# Patient Record
Sex: FEMALE | Race: WHITE | NOT HISPANIC OR LATINO
[De-identification: names, ages, dates, MRNs, and addresses within clinical notes are randomized per-mention and may not be internally consistent; named-entity substitution may affect disease eponyms.]

---

## 2021-06-08 ENCOUNTER — TRANSCRIPTION ENCOUNTER (OUTPATIENT)
Age: 66
End: 2021-06-08

## 2021-06-21 ENCOUNTER — TRANSCRIPTION ENCOUNTER (OUTPATIENT)
Age: 66
End: 2021-06-21

## 2021-12-21 ENCOUNTER — TRANSCRIPTION ENCOUNTER (OUTPATIENT)
Age: 66
End: 2021-12-21

## 2022-01-12 ENCOUNTER — TRANSCRIPTION ENCOUNTER (OUTPATIENT)
Age: 67
End: 2022-01-12

## 2022-06-18 ENCOUNTER — EMERGENCY (EMERGENCY)
Facility: HOSPITAL | Age: 67
LOS: 1 days | Discharge: HOME | End: 2022-06-18
Attending: EMERGENCY MEDICINE
Payer: MEDICARE

## 2022-06-18 VITALS
SYSTOLIC BLOOD PRESSURE: 138 MMHG | DIASTOLIC BLOOD PRESSURE: 79 MMHG | HEIGHT: 61 IN | WEIGHT: 169.09 LBS | RESPIRATION RATE: 18 BRPM | OXYGEN SATURATION: 99 % | HEART RATE: 72 BPM | TEMPERATURE: 99 F

## 2022-06-18 PROCEDURE — 72100 X-RAY EXAM L-S SPINE 2/3 VWS: CPT

## 2022-06-18 PROCEDURE — 99284 EMERGENCY DEPT VISIT MOD MDM: CPT

## 2022-06-18 PROCEDURE — 99283 EMERGENCY DEPT VISIT LOW MDM: CPT | Mod: 25

## 2022-06-18 PROCEDURE — 96372 THER/PROPH/DIAG INJ SC/IM: CPT

## 2022-06-18 PROCEDURE — 72100 X-RAY EXAM L-S SPINE 2/3 VWS: CPT | Mod: 26

## 2022-06-18 RX ORDER — METHOCARBAMOL 500 MG/1
2 TABLET, FILM COATED ORAL
Qty: 30 | Refills: 0
Start: 2022-06-18 | End: 2022-06-22

## 2022-06-18 RX ORDER — KETOROLAC TROMETHAMINE 30 MG/ML
15 SYRINGE (ML) INJECTION ONCE
Refills: 0 | Status: DISCONTINUED | OUTPATIENT
Start: 2022-06-18 | End: 2022-06-18

## 2022-06-18 RX ORDER — METHOCARBAMOL 500 MG/1
1500 TABLET, FILM COATED ORAL ONCE
Refills: 0 | Status: COMPLETED | OUTPATIENT
Start: 2022-06-18 | End: 2022-06-18

## 2022-06-18 RX ORDER — ACETAMINOPHEN 500 MG
2 TABLET ORAL
Qty: 168 | Refills: 0
Start: 2022-06-18 | End: 2022-07-01

## 2022-06-18 RX ORDER — ACETAMINOPHEN 500 MG
650 TABLET ORAL ONCE
Refills: 0 | Status: COMPLETED | OUTPATIENT
Start: 2022-06-18 | End: 2022-06-18

## 2022-06-18 RX ADMIN — Medication 650 MILLIGRAM(S): at 13:23

## 2022-06-18 RX ADMIN — Medication 15 MILLIGRAM(S): at 13:24

## 2022-06-18 RX ADMIN — METHOCARBAMOL 1500 MILLIGRAM(S): 500 TABLET, FILM COATED ORAL at 13:24

## 2022-06-18 NOTE — ED PROVIDER NOTE - OBJECTIVE STATEMENT
67 yr old female with hx of spinal stenosis and lumbar disc herniation since 2019 seeing pain management s/p steroid injection 2 month ago (not first time) presents to ed c/o left lumbar pain radiating to lateral thigh down to knee like a burning throbbing pain since 1 day. + chronic constipation, no incontinence, no fever, no abd pain, no dysuria, no syncope, no trauma, no weigh loss. tried meloxicam and nsaid oitment without relieve. states usually after steroid injection pt is without pain.

## 2022-06-18 NOTE — ED PROVIDER NOTE - PROGRESS NOTE DETAILS
Vora: improve. pt ambulating. xr reviewed. no acute injury on my read.  dx lumbar strain likely worse due to known spinal stenosis and sciatica. rx robaxin  and tylenol. please use heat packs to area 3x/day 20 mins each session, take motrin 600mg every 6 hrs as needed, tylenol 650mg every 4 hrs as needed, stay active, no heavy lifting and return if symptoms  worsens.

## 2022-06-18 NOTE — ED PROVIDER NOTE - CONSTITUTIONAL, MLM
normal... Well appearing, awake, alert, oriented to person, place, time/situation and in no apparent distress. obese, lying on her right side

## 2022-06-18 NOTE — ED PROVIDER NOTE - NEUROLOGICAL, MLM
Alert and oriented, no focal deficits, no motor or sensory deficits. ambulating with limp on left side

## 2022-06-18 NOTE — ED PROVIDER NOTE - CLINICAL SUMMARY MEDICAL DECISION MAKING FREE TEXT BOX
67 yr old female with hx of spinal stenosis and lumbar disc herniation since 2019 seeing pain management s/p steroid injection 2 month ago (not first time) presents to ed c/o left lumbar pain radiating to lateral thigh down to knee like a burning throbbing pain since 1 day. + chronic constipation, no incontinence, no fever, no abd pain, no dysuria, no syncope, no trauma, no weigh loss. tried meloxicam and nsaid oitment without relieve. states usually after steroid injection pt is without pain.    left known sciatica and spinal stenosis likely flare no red flags. pt ambulatory no incontinence. - xr lumbar to r/o occult compression fx. robaxin, toradol, tylenol, likely dc.

## 2022-06-18 NOTE — ED PROVIDER NOTE - PATIENT PORTAL LINK FT
You can access the FollowMyHealth Patient Portal offered by [facility] by registering at the following website: [email or web address]. By joining [employer]’s FollowMyHealth portal, you will also be able to view your health information using other applications (apps) compatible with our system.

## 2022-09-16 ENCOUNTER — INPATIENT (INPATIENT)
Facility: HOSPITAL | Age: 67
LOS: 4 days | Discharge: HOME | DRG: 446 | End: 2022-09-21
Attending: HOSPITALIST | Admitting: HOSPITALIST
Payer: MEDICARE

## 2022-09-16 VITALS
HEIGHT: 61 IN | RESPIRATION RATE: 16 BRPM | DIASTOLIC BLOOD PRESSURE: 79 MMHG | OXYGEN SATURATION: 98 % | WEIGHT: 145.95 LBS | SYSTOLIC BLOOD PRESSURE: 145 MMHG | TEMPERATURE: 98 F | HEART RATE: 84 BPM

## 2022-09-16 DIAGNOSIS — Z29.9 ENCOUNTER FOR PROPHYLACTIC MEASURES, UNSPECIFIED: ICD-10-CM

## 2022-09-16 DIAGNOSIS — Z98.890 OTHER SPECIFIED POSTPROCEDURAL STATES: Chronic | ICD-10-CM

## 2022-09-16 DIAGNOSIS — K80.50 CALCULUS OF BILE DUCT WITHOUT CHOLANGITIS OR CHOLECYSTITIS WITHOUT OBSTRUCTION: ICD-10-CM

## 2022-09-16 DIAGNOSIS — Z98.891 HISTORY OF UTERINE SCAR FROM PREVIOUS SURGERY: Chronic | ICD-10-CM

## 2022-09-16 DIAGNOSIS — Z87.19 PERSONAL HISTORY OF OTHER DISEASES OF THE DIGESTIVE SYSTEM: Chronic | ICD-10-CM

## 2022-09-16 DIAGNOSIS — Z90.49 ACQUIRED ABSENCE OF OTHER SPECIFIED PARTS OF DIGESTIVE TRACT: Chronic | ICD-10-CM

## 2022-09-16 LAB
ALBUMIN SERPL ELPH-MCNC: 3.7 G/DL — SIGNIFICANT CHANGE UP (ref 3.5–5)
ALP SERPL-CCNC: 129 U/L — HIGH (ref 40–120)
ALT FLD-CCNC: 45 U/L DA — SIGNIFICANT CHANGE UP (ref 10–60)
ANION GAP SERPL CALC-SCNC: 7 MMOL/L — SIGNIFICANT CHANGE UP (ref 5–17)
APPEARANCE UR: CLEAR — SIGNIFICANT CHANGE UP
APTT BLD: 30.7 SEC — SIGNIFICANT CHANGE UP (ref 27.5–35.5)
AST SERPL-CCNC: 25 U/L — SIGNIFICANT CHANGE UP (ref 10–40)
BASOPHILS # BLD AUTO: 0.03 K/UL — SIGNIFICANT CHANGE UP (ref 0–0.2)
BASOPHILS NFR BLD AUTO: 0.6 % — SIGNIFICANT CHANGE UP (ref 0–2)
BILIRUB DIRECT SERPL-MCNC: 0.2 MG/DL — SIGNIFICANT CHANGE UP (ref 0–0.3)
BILIRUB INDIRECT FLD-MCNC: 0.5 MG/DL — SIGNIFICANT CHANGE UP (ref 0.2–1)
BILIRUB SERPL-MCNC: 0.7 MG/DL — SIGNIFICANT CHANGE UP (ref 0.2–1.2)
BILIRUB SERPL-MCNC: 0.7 MG/DL — SIGNIFICANT CHANGE UP (ref 0.2–1.2)
BILIRUB UR-MCNC: NEGATIVE — SIGNIFICANT CHANGE UP
BUN SERPL-MCNC: 14 MG/DL — SIGNIFICANT CHANGE UP (ref 7–18)
CALCIUM SERPL-MCNC: 9.2 MG/DL — SIGNIFICANT CHANGE UP (ref 8.4–10.5)
CHLORIDE SERPL-SCNC: 103 MMOL/L — SIGNIFICANT CHANGE UP (ref 96–108)
CO2 SERPL-SCNC: 29 MMOL/L — SIGNIFICANT CHANGE UP (ref 22–31)
COLOR SPEC: YELLOW — SIGNIFICANT CHANGE UP
CREAT SERPL-MCNC: 0.62 MG/DL — SIGNIFICANT CHANGE UP (ref 0.5–1.3)
DIFF PNL FLD: NEGATIVE — SIGNIFICANT CHANGE UP
EGFR: 97 ML/MIN/1.73M2 — SIGNIFICANT CHANGE UP
EOSINOPHIL # BLD AUTO: 0.07 K/UL — SIGNIFICANT CHANGE UP (ref 0–0.5)
EOSINOPHIL NFR BLD AUTO: 1.5 % — SIGNIFICANT CHANGE UP (ref 0–6)
GLUCOSE SERPL-MCNC: 98 MG/DL — SIGNIFICANT CHANGE UP (ref 70–99)
GLUCOSE UR QL: NEGATIVE — SIGNIFICANT CHANGE UP
HCT VFR BLD CALC: 39 % — SIGNIFICANT CHANGE UP (ref 34.5–45)
HGB BLD-MCNC: 13.2 G/DL — SIGNIFICANT CHANGE UP (ref 11.5–15.5)
IMM GRANULOCYTES NFR BLD AUTO: 0.2 % — SIGNIFICANT CHANGE UP (ref 0–0.9)
INR BLD: 1.12 RATIO — SIGNIFICANT CHANGE UP (ref 0.88–1.16)
KETONES UR-MCNC: NEGATIVE — SIGNIFICANT CHANGE UP
LACTATE SERPL-SCNC: 0.6 MMOL/L — LOW (ref 0.7–2)
LEUKOCYTE ESTERASE UR-ACNC: NEGATIVE — SIGNIFICANT CHANGE UP
LIDOCAIN IGE QN: 294 U/L — SIGNIFICANT CHANGE UP (ref 73–393)
LYMPHOCYTES # BLD AUTO: 1.36 K/UL — SIGNIFICANT CHANGE UP (ref 1–3.3)
LYMPHOCYTES # BLD AUTO: 29.3 % — SIGNIFICANT CHANGE UP (ref 13–44)
MAGNESIUM SERPL-MCNC: 2.4 MG/DL — SIGNIFICANT CHANGE UP (ref 1.6–2.6)
MCHC RBC-ENTMCNC: 30.6 PG — SIGNIFICANT CHANGE UP (ref 27–34)
MCHC RBC-ENTMCNC: 33.8 GM/DL — SIGNIFICANT CHANGE UP (ref 32–36)
MCV RBC AUTO: 90.3 FL — SIGNIFICANT CHANGE UP (ref 80–100)
MONOCYTES # BLD AUTO: 0.37 K/UL — SIGNIFICANT CHANGE UP (ref 0–0.9)
MONOCYTES NFR BLD AUTO: 8 % — SIGNIFICANT CHANGE UP (ref 2–14)
NEUTROPHILS # BLD AUTO: 2.8 K/UL — SIGNIFICANT CHANGE UP (ref 1.8–7.4)
NEUTROPHILS NFR BLD AUTO: 60.4 % — SIGNIFICANT CHANGE UP (ref 43–77)
NITRITE UR-MCNC: NEGATIVE — SIGNIFICANT CHANGE UP
NRBC # BLD: 0 /100 WBCS — SIGNIFICANT CHANGE UP (ref 0–0)
PH UR: 7 — SIGNIFICANT CHANGE UP (ref 5–8)
PLATELET # BLD AUTO: 155 K/UL — SIGNIFICANT CHANGE UP (ref 150–400)
POTASSIUM SERPL-MCNC: 4 MMOL/L — SIGNIFICANT CHANGE UP (ref 3.5–5.3)
POTASSIUM SERPL-SCNC: 4 MMOL/L — SIGNIFICANT CHANGE UP (ref 3.5–5.3)
PROT SERPL-MCNC: 7.3 G/DL — SIGNIFICANT CHANGE UP (ref 6–8.3)
PROT UR-MCNC: NEGATIVE — SIGNIFICANT CHANGE UP
PROTHROM AB SERPL-ACNC: 13.4 SEC — SIGNIFICANT CHANGE UP (ref 10.5–13.4)
RBC # BLD: 4.32 M/UL — SIGNIFICANT CHANGE UP (ref 3.8–5.2)
RBC # FLD: 12.4 % — SIGNIFICANT CHANGE UP (ref 10.3–14.5)
SARS-COV-2 RNA SPEC QL NAA+PROBE: SIGNIFICANT CHANGE UP
SODIUM SERPL-SCNC: 139 MMOL/L — SIGNIFICANT CHANGE UP (ref 135–145)
SP GR SPEC: 1.01 — SIGNIFICANT CHANGE UP (ref 1.01–1.02)
TROPONIN I, HIGH SENSITIVITY RESULT: 4.1 NG/L — SIGNIFICANT CHANGE UP
UROBILINOGEN FLD QL: NEGATIVE — SIGNIFICANT CHANGE UP
WBC # BLD: 4.64 K/UL — SIGNIFICANT CHANGE UP (ref 3.8–10.5)
WBC # FLD AUTO: 4.64 K/UL — SIGNIFICANT CHANGE UP (ref 3.8–10.5)

## 2022-09-16 PROCEDURE — 99223 1ST HOSP IP/OBS HIGH 75: CPT | Mod: GC

## 2022-09-16 PROCEDURE — 99285 EMERGENCY DEPT VISIT HI MDM: CPT

## 2022-09-16 PROCEDURE — 93010 ELECTROCARDIOGRAM REPORT: CPT

## 2022-09-16 RX ORDER — SODIUM CHLORIDE 9 MG/ML
1000 INJECTION, SOLUTION INTRAVENOUS ONCE
Refills: 0 | Status: COMPLETED | OUTPATIENT
Start: 2022-09-16 | End: 2022-09-16

## 2022-09-16 RX ORDER — HEPARIN SODIUM 5000 [USP'U]/ML
5000 INJECTION INTRAVENOUS; SUBCUTANEOUS EVERY 8 HOURS
Refills: 0 | Status: DISCONTINUED | OUTPATIENT
Start: 2022-09-16 | End: 2022-09-21

## 2022-09-16 RX ORDER — ATORVASTATIN CALCIUM 80 MG/1
40 TABLET, FILM COATED ORAL AT BEDTIME
Refills: 0 | Status: DISCONTINUED | OUTPATIENT
Start: 2022-09-16 | End: 2022-09-21

## 2022-09-16 RX ORDER — LINACLOTIDE 145 UG/1
1 CAPSULE, GELATIN COATED ORAL
Qty: 0 | Refills: 0 | DISCHARGE

## 2022-09-16 RX ORDER — TRAMADOL HYDROCHLORIDE 50 MG/1
50 TABLET ORAL EVERY 4 HOURS
Refills: 0 | Status: DISCONTINUED | OUTPATIENT
Start: 2022-09-16 | End: 2022-09-21

## 2022-09-16 RX ORDER — ACETAMINOPHEN 500 MG
650 TABLET ORAL EVERY 6 HOURS
Refills: 0 | Status: DISCONTINUED | OUTPATIENT
Start: 2022-09-16 | End: 2022-09-21

## 2022-09-16 RX ORDER — AMPHETAMINE SULFATE 5 MG/1
1 TABLET ORAL
Qty: 0 | Refills: 0 | DISCHARGE

## 2022-09-16 RX ORDER — METOCLOPRAMIDE HCL 10 MG
1 TABLET ORAL
Qty: 0 | Refills: 0 | DISCHARGE

## 2022-09-16 RX ORDER — ICOSAPENT ETHYL 500 MG/1
0.62 CAPSULE, LIQUID FILLED ORAL
Qty: 0 | Refills: 0 | DISCHARGE

## 2022-09-16 RX ADMIN — HEPARIN SODIUM 5000 UNIT(S): 5000 INJECTION INTRAVENOUS; SUBCUTANEOUS at 22:22

## 2022-09-16 RX ADMIN — SODIUM CHLORIDE 2000 MILLILITER(S): 9 INJECTION, SOLUTION INTRAVENOUS at 08:23

## 2022-09-16 RX ADMIN — ATORVASTATIN CALCIUM 40 MILLIGRAM(S): 80 TABLET, FILM COATED ORAL at 22:19

## 2022-09-16 RX ADMIN — HEPARIN SODIUM 5000 UNIT(S): 5000 INJECTION INTRAVENOUS; SUBCUTANEOUS at 13:23

## 2022-09-16 RX ADMIN — SODIUM CHLORIDE 1000 MILLILITER(S): 9 INJECTION, SOLUTION INTRAVENOUS at 10:00

## 2022-09-16 NOTE — H&P ADULT - ATTENDING COMMENTS
66 y/o Female w/ PMH of constipation and spinal stenosis presents with abdominal pain. Upon evaluation in the ED, patient hemodynamically stable, outpatient records showing 5mm stone in the CBD. Patient admitted to medicine for Choledocholithiasis.      #Choledocholithiasis  #Constipation  #spinal stenosis  Outpatient MRCP showing 5mm stone in CBD. Pain resolved as of now. Elevated alkphose 129  - GI consulted for ERCP - plan for monday  - Plan out cholecystectomy after ERCP, f/u surgery    - METS >4     - RCRI: Class 1 Risk, 3.9% 30-day risk of death, MI or cardiac arrest    - Solorzano: 0.1% risk of myocardial infarction or cardiac arrest intraoperatively or up to 30 days post op    - Moderate risk, no further testing  - NPO sunday MN  - Tylenol for pain  - Bowel regimen  - DVT ppx

## 2022-09-16 NOTE — CONSULT NOTE ADULT - SUBJECTIVE AND OBJECTIVE BOX
[  ] STAT REQUEST              [  ]ROUTINE REQUEST    Patient is a 67 year old female with abdominal pain. GI consulted to evaluate.        HPI:  68 yo F w hx of spinal stenosis c/o abdominal pain for 1 month.  Located in the epigastrium, radiating,  associated with nausea and vomiting.  pt went to see GI who did an MRCP that shows a 5 mm stone in the CBD.   Pt referred to hospital for ERCP and cholecystectomy.  Afebrile currently, c/o mild epigastric discomfort.  no n/v.    PAIN MANAGEMENT:  Pain Scale:                5-6 /10  Pain Location:  Epigastric pain      Prior Colonoscopy:  A few years ago      PAST MEDICAL HISTORY  Spinal stenosis    PAST SURGICAL HISTORY      Allergies    No Known Allergies    Intolerances  None      HOME MEDICATIONS    MEDICATIONS  (STANDING):    MEDICATIONS  (PRN):      SOCIAL HISTORY  Advanced Directives:       [  ] Full Code       [  ] DNR  Marital Status:         [  ] M      [  ] S      [  ] D       [  ] W  Children:       [  ] Yes      [  ] No  Occupation:        [  ] Employed       [  ] Unemployed       [  ] Retired  Diet:       [  ] Regular       [  ] PEG feeding          [  ] NG tube feeding  Drug Use:           [  ] Patient denied          [  ] Yes  Alcohol:           [  ] No             [  ] Yes (socially)         [  ] Yes (chronic)  Tobacco:           [  ] Yes           [  ] No    FAMILY HISTORY  [  ] Heart Disease            [  ] Diabetes             [  ] HTN             [  ] Colon Cancer             [  ] Stomach Cancer              [  ] Pancreatic Cancer    VITAL SIGNS  Vital Signs Last 24 Hrs  T(C): 36.6 (16 Sep 2022 07:47), Max: 36.6 (16 Sep 2022 07:47)  T(F): 97.8 (16 Sep 2022 07:47), Max: 97.8 (16 Sep 2022 07:47)  HR: 84 (16 Sep 2022 07:47) (84 - 84)  BP: 145/79 (16 Sep 2022 07:47) (145/79 - 145/79)   RR: 16 (16 Sep 2022 07:47) (16 - 16)  SpO2: 98% (16 Sep 2022 07:47) (98% - 98%)  Parameters below as of 16 Sep 2022 07:47  Patient On (Oxygen Delivery Method): room air   Daily Height in cm: 154.94 (16 Sep 2022 07:47)                                       RADIOLOGY/IMAGING                           [  ] STAT REQUEST              [  ]ROUTINE REQUEST  Patient is a 67 year old female with abdominal pain. GI consulted to evaluate.      HPI:  67 year old female with past medical history significant for constipation, arthritis, and spinal stenosis presents with intermittent epigastric pain, which began last night. Pt reports nausea, chills, NBNB vomiting, and epigastric pain, which prompted her ER visit. Denies fever, diarrhea, hematuria, or dysuria. Pt states that pain radiates to back and bowels, rated as 8/10 in severity. Last bowel movement occurred this morning, denies melena. Reports having 4 similar episodes since 2022. Outpt MRCP showed a 5 mm stone in CBD.     PAIN MANAGEMENT: Pain Scale:                8 /10 Pain Location:  Epigastric pain   Prior Colonoscopy:  A few years ago   PAST MEDICAL HISTORY Spinal stenosis Constipation Arthritis   PAST SURGICAL HISTORY Abdominoplasty   section  Varicose vein ligation and stripping  Appendectomy    Allergies  No Known Allergies  Intolerances None    MEDICATIONS  (STANDING): · 	Reglan 5 mg oral tablet: Last Dose Taken:  , 1 tab(s) orally 2 times a day · 	amphetamine 20 mg oral tablet, disintegrating: Last Dose Taken:  , 1 tab(s) orally once a day (in the morning) · 	Linzess 72 mcg oral capsule: Last Dose Taken:  , 1 cap(s) orally once a day · 	Vascepa: Last Dose Taken:  , Apply topically to affected area , As Needed    SOCIAL HISTORY Advanced Directives:       [  ] Full Code       [  ] DNR Marital Status:         [  ] M      [  ] S      [  ] D       [  ] W Children:       [  ] Yes      [  ] No Occupation:        [  ] Employed       [  ] Unemployed       [  ] Retired Diet:       [  ] Regular       [  ] PEG feeding          [  ] NG tube feeding Drug Use:           [  ] Patient denied          [  ] Yes Alcohol:           [  ] No             [  ] Yes (socially)         [  ] Yes (chronic) Tobacco:           [  ] Yes           [  ] No   FAMILY HISTORY [ X ] Heart Disease           [ X ] Diabetes            [ X ] HTN            [  ] Colon Cancer            [  ] Stomach Cancer             [  ] Pancreatic Cancer   VITAL SIGNS Vital Signs Last 24 Hrs T(C): 36.6 (16 Sep 2022 07:47), Max: 36.6 (16 Sep 2022 07:47) T(F): 97.8 (16 Sep 2022 07:47), Max: 97.8 (16 Sep 2022 07:47) HR: 84 (16 Sep 2022 07:47) (84 - 84) BP: 145/79 (16 Sep 2022 07:47) (145/79 - 145/79)  RR: 16 (16 Sep 2022 07:47) (16 - 16) SpO2: 98% (16 Sep 2022 07:47) (98% - 98%) Parameters below as of 16 Sep 2022 07:47 Patient On (Oxygen Delivery Method): room air  Daily Height in cm: 154.94 (16 Sep 2022 07:47)        Laboratory:  Blood Bank:   16-Sep-2022 08:58, Antibody Screen Interpretation Antibody Screen: NEG   16-Sep-2022 08:58, Type + Screen ABO RH Interpretation: O POS, 2022 9:39  JRACOL 	Attention: Second specimen is required for ABORH Confirmation. General Chemistry:   16-Sep-2022 08:15, Bilirubin - Total and Direct Bilirubin Total, Serum: 0.7, [0.2 - 1.2 mg/dL] Bilirubin Direct, Serum: 0.2, [0.0 - 0.3 mg/dL] Indirect Reacting Bilirubin: 0.5, [0.2 - 1.0 mg/dL]   16-Sep-2022 08:15, Comprehensive Metabolic Panel Sodium, Serum: 139, [135 - 145 mmol/L] Potassium, Serum: 4.0, [3.5 - 5.3 mmol/L] Chloride, Serum: 103, [96 - 108 mmol/L] Carbon Dioxide, Serum: 29, [22 - 31 mmol/L] Anion Gap, Serum: 7, [5 - 17 mmol/L] Blood Urea Nitrogen, Serum: 14, [7 - 18 mg/dL] Creatinine, Serum: 0.62, [0.50 - 1.30 mg/dL] Glucose, Serum: 98, [70 - 99 mg/dL] Calcium, Total Serum: 9.2, [8.4 - 10.5 mg/dL] Protein Total, Serum: 7.3, [6.0 - 8.3 g/dL] Albumin, Serum: 3.7, [3.5 - 5.0 g/dL] Bilirubin Total, Serum: 0.7, [0.2 - 1.2 mg/dL] Alkaline Phosphatase, Serum:    129, [40 - 120 U/L] Aspartate Aminotransferase (AST/SGOT): 25, [10 - 40 U/L] Alanine Aminotransferase (ALT/SGPT): 45, [10 - 60 U/L DA] eGFR: 97, [>=60 mL/min/1.73m2], The estimated glomerular filtration rate (eGFR) is calculated using the 	 CKD-EPI creatinine equation, which does not have a coefficient for 	race and is validated in individuals 18 years of age and older (N Engl J 	Med ; [id number]). Creatinine-based eGFR may be inaccurate in 	various situations including but not limited to extremes of muscle mass, 	altered dietary protein intake, or medications that affect renal tubular 	creatinine secretion.   16-Sep-2022 08:15, Lactate, Blood Lactate, Blood:    0.6, [0.7 - 2.0 mmol/L]   16-Sep-2022 08:15, Lipase, Serum Lipase, Serum: 294, [73 - 393 U/L]   16-Sep-2022 08:15, Magnesium, Serum Magnesium, Serum: 2.4, [1.6 - 2.6 mg/dL]   Urinalysis (22 @ 09:34)  Glucose Qualitative, Urine: Negative  Blood, Urine: Negative  pH Urine: 7.0  Color: Yellow  Urine Appearance: Clear  Bilirubin: Negative  Ketone - Urine: Negative  Specific Gravity: 1.010  Protein, Urine: Negative  Urobilinogen: Negative  Nitrite: Negative  Leukocyte Esterase Concentration: Negative

## 2022-09-16 NOTE — H&P ADULT - NSHPREVIEWOFSYSTEMS_GEN_ALL_CORE
CONSTITUTIONAL: No changes in appetite or no generalized weakness.  No fever, weight loss, or fatigue  EYES: No eye pain, visual disturbances, or discharge  ENT:  No difficulty hearing, tinnitus, vertigo; No sinus or throat pain  NECK: No pain or stiffness  RESPIRATORY: No cough, wheezing, chills or hemoptysis; No Shortness of Breath  CARDIOVASCULAR: No chest pain, palpitations, passing out, dizziness, or leg swelling  GASTROINTESTINAL: Has  abdominal  pain. No nausea, vomiting, or hematemesis; No diarrhea or constipation. No melena or hematochezia.  GENITOURINARY: No dysuria, frequency, hematuria, or incontinence  NEUROLOGICAL: No headaches, memory loss, loss of strength, numbness, or tremors  SKIN: No skin lesions   LYMPH Nodes: No enlarged glands  ENDOCRINE: No heat or cold intolerance; No hair loss  MUSCULOSKELETAL: No joint pain or swelling; No muscle, back, No extremity pain  PSYCHIATRIC: No depression, anxiety, mood swings, or difficulty sleeping  HEME/LYMPH: No easy bruising, or bleeding gums  ALLERGY AND IMMUNOLOGIC: No hives or eczema

## 2022-09-16 NOTE — CONSULT NOTE ADULT - ASSESSMENT
67 year old female with choledocholithiasis and upper abdominal pain.   NO leukocytosis, no elevation in bilirubin or transaminitis, afebrile    - recommend GI consult for management of choledocholithiasis   - laparoscopic cholecystectomy planning after GI intervention  - monitor labs   - pain control   - will discuss with Dr. Bey 
1. Abdominal pain  2. Cholelithiasis  3. CBD stone  4. Biliary colic  5. No evidence of biliary obstruction  6. No evidence of cholangitis    Suggestions:    1. Surgical evaluation  2. Obtain MRCP report  3. NPO  4. IVF hydration  5. ERCP by Dr Nicole / Dr Lyons  6. Avoid NSAID  7. Protonix daily  8. DVT prophylaxis

## 2022-09-16 NOTE — ED PROVIDER NOTE - NSTIMEPROVIDERCAREINITIATE_GEN_ER
Problem: Communication  Goal: The ability to communicate needs accurately and effectively will improve  Outcome: PROGRESSING AS EXPECTED  Note: Pt's whiteboard is updated, pt has been updated on POC, all questions have been answered at this time       Problem: Safety  Goal: Will remain free from falls  Outcome: PROGRESSING AS EXPECTED  Note: Bed locked in lowest position. Bed alarm on. Treaded socks in use. Call light and belongings within reach. Pt educated to call for assistance. Pt verbalized understanding. Hourly rounding in place.       16-Sep-2022 07:55

## 2022-09-16 NOTE — ED PROVIDER NOTE - OBJECTIVE STATEMENT
66 yo F w hx of spinal stenosis c/o abdominal pain for 1 month.  Located in the epigastrium, radiating,  associated with nausea and vomiting.  pt went to see GI who did an MRCP that shows a 5 mm stone in the CBD.   Pt referred to hospital for ERCP and cholecystectomy.  Afebrile currently, c/o mild epigastric discomfort.  no n/v.

## 2022-09-16 NOTE — CONSULT NOTE ADULT - RESPIRATORY
clear to auscultation bilaterally/no wheezes/no rales/no rhonchi/airway patent/breath sounds equal/good air movement

## 2022-09-16 NOTE — CONSULT NOTE ADULT - NEGATIVE HEMATOLOGY SYMPTOMS
Spoke with patient and conveyed below message. Pt did not have any further questions.   no gum bleeding/no nose bleeding/no skin lumps

## 2022-09-16 NOTE — H&P ADULT - HISTORY OF PRESENT ILLNESS
66 y/o female with PMH of constipation, arthritis, and spinal stenosis presents with intermittent epigastric pain, which began last night. Pt reports nausea, chills, NBNB vomiting, and epigastric pain, which prompted her ER visit. Denies fever, diarrhea, hematuria, or dysuria. Pt states that pain radiates to back and bowels, rated as 8/10 in severity. Last bowel movement occurred this morning, denies melena. Reports having 4 similar episodes since August 2022. Outpt MRCP showed 5 mm stone in CBD. Pt states that she visited her PCP on Thursday (9/15/2022). Pt's  was present at bedside. History was obtained via [place]  (ID #[id number]). 68 y/o female with PMH of constipation, arthritis, and spinal stenosis presents with intermittent epigastric pain, which began last night. Pt reports nausea, chills, NBNB vomiting, and epigastric pain, which prompted her ER visit. Denies fever, diarrhea, hematuria, or dysuria. Pt states that pain radiates to back and bowels, rated as 8/10 in severity. Last bowel movement occurred this morning, denies melena. Reports having 4 similar episodes since August 2022. Outpt MRCP showed a 5 mm stone in CBD. Pt states that she visited her PCP on Thursday (9/15/2022). Pt's  was present at bedside. History was obtained via [place]  (ID #[id number]).

## 2022-09-16 NOTE — ED ADULT NURSE NOTE - NSIMPLEMENTINTERV_GEN_ALL_ED
Implemented All Universal Safety Interventions:  [place] to call system. Call bell, personal items and telephone within reach. Instruct patient to call for assistance. Room bathroom lighting operational. Non-slip footwear when patient is off stretcher. Physically safe environment: no spills, clutter or unnecessary equipment. Stretcher in lowest position, wheels locked, appropriate side rails in place.

## 2022-09-16 NOTE — H&P ADULT - ASSESSMENT
68 y/o Female w/ PMH of constipation and spinal stenosis presents with choledocholithiasis 66 y/o Female w/ PMH of constipation and spinal stenosis presents with abdominal pain. Upon evaluation in the ED, patient hemodynamically stable, outpatient records showing 5mm stone in the CBD. Patient admitted to medicine for Choledocholithiasis.

## 2022-09-16 NOTE — H&P ADULT - PROBLEM SELECTOR PLAN 1
- MRCP revealed 5 mm stone in CBD  - AlkPhos 129, lipase and LFTs WNL  - total bili 0.7  - GI consulted (Dr. Gilmore) --> recommend ERCP  - Sx consulted --> recommend lap debbie after ERCP - MRCP revealed 5 mm stone in CBD  - AlkPhos 129, lipase and LFTs WNL  - total bili 0.7  - afebrile no leukocytosis, normal bili  - hold abx no concern for cholangitis   - GI consult for  ERCP  - Surgery consult noted   - NPO after midnight  - pre-procedure labs   - pain management - MRCP revealed 5 mm stone in CBD  - AlkPhos 129, lipase and LFTs WNL  - total bili 0.7  - afebrile no leukocytosis, normal bili  - hold abx no concern for cholangitis   - GI consult for  ERCP  - Surgery consult noted   - NPO after midnight  - pre-procedure labs   - pain management acetaminophen for mild pain and tramadol for moderate pain  - patient euvolemic on exam METS >4 no further inpatient testing needed  - RCRI: Class 1 Risk, 3.9% 30-day risk of death, MI or cardiac arrest  - Solorzano: 0.1% risk of myocardial infarction or cardiac arrest intraoperatively or up to 30 days post op

## 2022-09-16 NOTE — CONSULT NOTE ADULT - SUBJECTIVE AND OBJECTIVE BOX
HPI: 67 year old female with PMH of spinal stenosis and PSH of Laparoscopic Appendectomy, , umbilical hernia repair and tummy tuck presented to the ED due to outpatient MRCP findings of choledocholithiasis. Patient has been having symptoms of upper abdominal and back pain for one month. Admits to nausea and vomiting at home which is currently resolved. Denies fever, chills, jaundice of the yes or the skin.    PAST MEDICAL & SURGICAL HISTORY: contained within HPI     Review of Systems: Contained within HPI     Allergies: No Known Allergies    SOCIAL HISTORY: Denies smoking history     Vital Signs Last 24 Hrs  T(C): 36.4 (16 Sep 2022 10:15), Max: 36.6 (16 Sep 2022 07:47)  T(F): 97.6 (16 Sep 2022 10:15), Max: 97.8 (16 Sep 2022 07:47)  HR: 64 (16 Sep 2022 10:15) (64 - 84)  BP: 136/78 (16 Sep 2022 10:15) (136/78 - 145/79)  RR: 16 (16 Sep 2022 10:15) (16 - 16)  SpO2: 98% (16 Sep 2022 10:15) (98% - 98%)    Parameters below as of 16 Sep 2022 10:15  Patient On (Oxygen Delivery Method): room air    Physical Exam:  General:  Appears stated age, well-groomed, well-nourished, no distress  Eyes: EOMI, no jaundice  HENT:  WNL, no JVD  Chest: respirations nonlabored  Cardiovascular:  Regular rate & rhythm  Abdomen: well healed abdominal scars, soft, tenderness to palpation across upper abdomen, nondistended  Extremities: no edema bilaterally  Skin: warm and dry, no jaundice  Musculoskeletal: no calf tenderness  Neuro:  Alert, oriented to time, place and person   Psych: normal affect    LABS:                        13.2   4.64  )-----------( 155      ( 16 Sep 2022 08:15 )             39.0     09-16    139  |  103  |  14  ----------------------------<  98  4.0   |  29  |  0.62    Ca    9.2      16 Sep 2022 08:15  Mg     2.4     09-16    TPro  7.3  /  Alb  3.7  /  TBili  0.7  /  DBili  0.2  /  AST  25  /  ALT  45  /  AlkPhos  129<H>  09-16    PT/INR - ( 16 Sep 2022 08:15 )   PT: 13.4 sec;   INR: 1.12 ratio      PTT - ( 16 Sep 2022 08:15 )  PTT:30.7 sec  Urinalysis Basic - ( 16 Sep 2022 09:34 )    Color: Yellow / Appearance: Clear / S.010 / pH: x  Gluc: x / Ketone: Negative  / Bili: Negative / Urobili: Negative   Blood: x / Protein: Negative / Nitrite: Negative   Leuk Esterase: Negative / RBC: x / WBC x   Sq Epi: x / Non Sq Epi: x / Bacteria: x    RADIOLOGY & ADDITIONAL STUDIES:  Outpatient MRCP showing mobile 5mm CBD stone. Mild cholelithiasis. Mild gallbladder wall thickening.

## 2022-09-16 NOTE — H&P ADULT - NSHPPHYSICALEXAM_GEN_ALL_CORE
T(C): 36.4 (09-16-22 @ 10:15), Max: 36.6 (09-16-22 @ 07:47)  HR: 64 (09-16-22 @ 10:15) (64 - 84)  BP: 136/78 (09-16-22 @ 10:15) (136/78 - 145/79)  RR: 16 (09-16-22 @ 10:15) (16 - 16)  SpO2: 98% (09-16-22 @ 10:15) (98% - 98%)    CONSTITUTIONAL: Well groomed, no apparent distress  EYES: PERRLA and symmetric, EOMI, No conjunctival or scleral injection, non-icteric  NECK: Supple, symmetric and without tracheal deviation   RESP: No respiratory distress, no use of accessory muscles; CTA b/l, no WRR  CV: RRR, +S1S2, no MRG; no JVD; no peripheral edema  GI: Soft, NT, ND, no rebound, no guarding; no palpable masses; no hepatosplenomegaly; no hernia palpated  LYMPH: No cervical LAD or tenderness; no axillary LAD or tenderness; no inguinal LAD or tenderness  MSK: Normal gait; No digital clubbing or cyanosis; examination of the (head/neck/spine/ribs/pelvis, RUE, LUE, RLE, LLE) without misalignment,            Normal ROM without pain, no spinal tenderness, normal muscle strength/tone  SKIN: No rashes or ulcers noted; no subcutaneous nodules or induration palpable  NEURO: CN II-XII intact; normal reflexes in upper and lower extremities, sensation intact in upper and lower extremities b/l to light touch   PSYCH: Appropriate insight/judgment; A+O x 3, mood and affect appropriate, recent/remote memory intact T(C): 36.4 (09-16-22 @ 10:15), Max: 36.6 (09-16-22 @ 07:47)  HR: 64 (09-16-22 @ 10:15) (64 - 84)  BP: 136/78 (09-16-22 @ 10:15) (136/78 - 145/79)  RR: 16 (09-16-22 @ 10:15) (16 - 16)  SpO2: 98% (09-16-22 @ 10:15) (98% - 98%)    CONSTITUTIONAL: Well groomed, no apparent distress  EYES: PERRLA and symmetric, EOMI, No conjunctival or scleral injection, non-icteric  NECK: Supple, symmetric and without tracheal deviation   RESP: No respiratory distress, no use of accessory muscles; CTA b/l, no WRR  CV: RRR, +S1S2, no MRG; no JVD; no peripheral edema  GI: Soft, NT, ND, no rebound, no guarding; no palpable masses; no hepatosplenomegaly; no hernia palpated  LYMPH: No cervical LAD or tenderness; no axillary LAD or tenderness; no inguinal LAD or tenderness  SKIN: No rashes or ulcers noted; no subcutaneous nodules or induration palpable  NEURO: CN II-XII intact; normal reflexes in upper and lower extremities, sensation intact in upper and lower extremities b/l to light touch   PSYCH: Appropriate insight/judgment; A+O x 3, mood and affect appropriate, recent/remote memory intact

## 2022-09-16 NOTE — ED PROVIDER NOTE - PHYSICAL EXAMINATION
CONSTITUTIONAL: Well-developed; well-nourished; in no acute distress.   SKIN: warm, dry  HEAD: Normocephalic; atraumatic.  EYES: PERRL, EOMI, no conjunctival erythema  ENT: No nasal discharge; airway clear.  NECK: Supple; non tender.  CARD: S1, S2 normal; no murmurs, gallops, or rubs. Regular rate and rhythm.   RESP: No wheezes, rales or rhonchi.  ABD: soft, mild ttp in epigastrium    EXT: Normal ROM.  No clubbing, cyanosis or edema.   LYMPH: No acute cervical adenopathy.  NEURO: Alert, oriented, grossly unremarkable  PSYCH: Cooperative, appropriate.

## 2022-09-16 NOTE — ED PROVIDER NOTE - NS ED ROS FT
Eyes:  No visual changes, eye pain or discharge.  ENMT:  No hearing changes, pain, no sore throat or runny nose, no difficulty swallowing  Cardiac:  No chest pain, SOB or edema. No chest pain with exertion.  Respiratory:  No cough or respiratory distress. No hemoptysis. No history of asthma or RAD.  GI: + nausea, + abd pain   :  No dysuria, frequency or burning.  MS:  No myalgia, muscle weakness, joint pain or back pain.  Neuro:  No headache or weakness.  No LOC.  Skin:  No skin rash.   Endocrine: No history of thyroid disease or diabetes.

## 2022-09-16 NOTE — H&P ADULT - NSICDXPASTSURGICALHX_GEN_ALL_CORE_FT
PAST SURGICAL HISTORY:  H/O abdominoplasty     H/O  section     H/O varicose vein ligation and stripping     History of appendectomy     History of umbilical hernia

## 2022-09-17 LAB
ALBUMIN SERPL ELPH-MCNC: 3.6 G/DL — SIGNIFICANT CHANGE UP (ref 3.5–5)
ALP SERPL-CCNC: 129 U/L — HIGH (ref 40–120)
ALT FLD-CCNC: 41 U/L DA — SIGNIFICANT CHANGE UP (ref 10–60)
ANION GAP SERPL CALC-SCNC: 8 MMOL/L — SIGNIFICANT CHANGE UP (ref 5–17)
APTT BLD: 29.3 SEC — SIGNIFICANT CHANGE UP (ref 27.5–35.5)
AST SERPL-CCNC: 21 U/L — SIGNIFICANT CHANGE UP (ref 10–40)
BILIRUB SERPL-MCNC: 0.8 MG/DL — SIGNIFICANT CHANGE UP (ref 0.2–1.2)
BLD GP AB SCN SERPL QL: SIGNIFICANT CHANGE UP
BUN SERPL-MCNC: 9 MG/DL — SIGNIFICANT CHANGE UP (ref 7–18)
CALCIUM SERPL-MCNC: 9.4 MG/DL — SIGNIFICANT CHANGE UP (ref 8.4–10.5)
CHLORIDE SERPL-SCNC: 103 MMOL/L — SIGNIFICANT CHANGE UP (ref 96–108)
CO2 SERPL-SCNC: 28 MMOL/L — SIGNIFICANT CHANGE UP (ref 22–31)
CREAT SERPL-MCNC: 0.64 MG/DL — SIGNIFICANT CHANGE UP (ref 0.5–1.3)
EGFR: 96 ML/MIN/1.73M2 — SIGNIFICANT CHANGE UP
GLUCOSE BLDC GLUCOMTR-MCNC: 138 MG/DL — HIGH (ref 70–99)
GLUCOSE SERPL-MCNC: 117 MG/DL — HIGH (ref 70–99)
HCT VFR BLD CALC: 42.3 % — SIGNIFICANT CHANGE UP (ref 34.5–45)
HCV AB S/CO SERPL IA: 0.17 S/CO — SIGNIFICANT CHANGE UP (ref 0–0.99)
HCV AB SERPL-IMP: SIGNIFICANT CHANGE UP
HGB BLD-MCNC: 13.9 G/DL — SIGNIFICANT CHANGE UP (ref 11.5–15.5)
INR BLD: 1.12 RATIO — SIGNIFICANT CHANGE UP (ref 0.88–1.16)
MAGNESIUM SERPL-MCNC: 2.4 MG/DL — SIGNIFICANT CHANGE UP (ref 1.6–2.6)
MCHC RBC-ENTMCNC: 30 PG — SIGNIFICANT CHANGE UP (ref 27–34)
MCHC RBC-ENTMCNC: 32.9 GM/DL — SIGNIFICANT CHANGE UP (ref 32–36)
MCV RBC AUTO: 91.2 FL — SIGNIFICANT CHANGE UP (ref 80–100)
NRBC # BLD: 0 /100 WBCS — SIGNIFICANT CHANGE UP (ref 0–0)
PHOSPHATE SERPL-MCNC: 3.9 MG/DL — SIGNIFICANT CHANGE UP (ref 2.5–4.5)
PLATELET # BLD AUTO: 158 K/UL — SIGNIFICANT CHANGE UP (ref 150–400)
POTASSIUM SERPL-MCNC: 4.3 MMOL/L — SIGNIFICANT CHANGE UP (ref 3.5–5.3)
POTASSIUM SERPL-SCNC: 4.3 MMOL/L — SIGNIFICANT CHANGE UP (ref 3.5–5.3)
PROT SERPL-MCNC: 7.2 G/DL — SIGNIFICANT CHANGE UP (ref 6–8.3)
PROTHROM AB SERPL-ACNC: 13.4 SEC — SIGNIFICANT CHANGE UP (ref 10.5–13.4)
RBC # BLD: 4.64 M/UL — SIGNIFICANT CHANGE UP (ref 3.8–5.2)
RBC # FLD: 12.5 % — SIGNIFICANT CHANGE UP (ref 10.3–14.5)
SODIUM SERPL-SCNC: 139 MMOL/L — SIGNIFICANT CHANGE UP (ref 135–145)
WBC # BLD: 5.73 K/UL — SIGNIFICANT CHANGE UP (ref 3.8–10.5)
WBC # FLD AUTO: 5.73 K/UL — SIGNIFICANT CHANGE UP (ref 3.8–10.5)

## 2022-09-17 PROCEDURE — 99232 SBSQ HOSP IP/OBS MODERATE 35: CPT

## 2022-09-17 RX ORDER — INFLUENZA VIRUS VACCINE 15; 15; 15; 15 UG/.5ML; UG/.5ML; UG/.5ML; UG/.5ML
0.7 SUSPENSION INTRAMUSCULAR ONCE
Refills: 0 | Status: DISCONTINUED | OUTPATIENT
Start: 2022-09-17 | End: 2022-09-21

## 2022-09-17 RX ADMIN — Medication 650 MILLIGRAM(S): at 22:28

## 2022-09-17 RX ADMIN — Medication 650 MILLIGRAM(S): at 23:30

## 2022-09-17 RX ADMIN — HEPARIN SODIUM 5000 UNIT(S): 5000 INJECTION INTRAVENOUS; SUBCUTANEOUS at 14:25

## 2022-09-17 RX ADMIN — ATORVASTATIN CALCIUM 40 MILLIGRAM(S): 80 TABLET, FILM COATED ORAL at 22:29

## 2022-09-17 RX ADMIN — HEPARIN SODIUM 5000 UNIT(S): 5000 INJECTION INTRAVENOUS; SUBCUTANEOUS at 05:05

## 2022-09-17 RX ADMIN — HEPARIN SODIUM 5000 UNIT(S): 5000 INJECTION INTRAVENOUS; SUBCUTANEOUS at 22:32

## 2022-09-17 NOTE — PATIENT PROFILE ADULT - FALL HARM RISK - UNIVERSAL INTERVENTIONS
Bed in lowest position, wheels locked, appropriate side rails in place/Call bell, personal items and telephone in reach/Instruct patient to call for assistance before getting out of bed or chair/Non-slip footwear when patient is out of bed/[place] to call system/Physically safe environment - no spills, clutter or unnecessary equipment/Purposeful Proactive Rounding/Room/bathroom lighting operational, light cord in reach

## 2022-09-17 NOTE — PROGRESS NOTE ADULT - ASSESSMENT
presently no abd pain  vss  cbd stone per MRCP  GI consult  surgery will follow and discuss plan for lap debbie after mgt of cbd stone by GI

## 2022-09-17 NOTE — PROGRESS NOTE ADULT - SUBJECTIVE AND OBJECTIVE BOX
[   ] ICU                                          [   ] CCU                                      [ X  ] Medical Floor        Patient is a 67 year old female with abdominal pain. GI consulted to evaluate.          67 year old female with past medical history significant for constipation, arthritis, and spinal stenosis presents with intermittent epigastric pain, which began last night. Pt reports nausea, chills, NBNB vomiting, and epigastric pain, which prompted her ER visit. Denies fever, diarrhea, hematuria, or dysuria. Pt states that pain radiates to back and bowels, rated as 8/10 in severity. Last bowel movement occurred this morning, denies melena. Reports having 4 similar episodes since 2022. Outpt MRCP showed a 5 mm stone in CBD.      Patient appears comfortable. No new complaints reported, No abdominal pain, N/V, hematemesis, hematochezia, melena, fever, chills, chest pain, SOB, cough or diarrhea reported.      PAIN MANAGEMENT:  Pain Scale:                4 /10  Pain Location:  Epigastric pain      Prior Colonoscopy:  A few years ago      PAST MEDICAL HISTORY  Spinal stenosis  Constipation  Arthritis      PAST SURGICAL HISTORY  Abdominoplasty    section   Varicose vein ligation and stripping   Appendectomy       Allergies    No Known Allergies    Intolerances  None        SOCIAL HISTORY  Advanced Directives:       [  ] Full Code       [  ] DNR  Marital Status:         [  ] M      [  ] S      [  ] D       [  ] W  Children:       [  ] Yes      [  ] No  Occupation:        [  ] Employed       [  ] Unemployed       [  ] Retired  Diet:       [  ] Regular       [  ] PEG feeding          [  ] NG tube feeding  Drug Use:           [  ] Patient denied          [  ] Yes  Alcohol:           [  ] No             [  ] Yes (socially)         [  ] Yes (chronic)  Tobacco:           [  ] Yes           [  ] No      FAMILY HISTORY  [ X ] Heart Disease            [ X ] Diabetes             [ X ] HTN             [  ] Colon Cancer             [  ] Stomach Cancer              [  ] Pancreatic Cancer        VITALS  Vital Signs Last 24 Hrs  T(C): 36.9 (17 Sep 2022 13:21), Max: 36.9 (16 Sep 2022 16:08)  T(F): 98.5 (17 Sep 2022 13:21), Max: 98.5 (17 Sep 2022 13:21)  HR: 66 (17 Sep 2022 13:21) (56 - 67)  BP: 106/62 (17 Sep 2022 13:21) (98/60 - 137/72)   RR: 17 (17 Sep 2022 13:21) (17 - 18)  SpO2: 97% (17 Sep 2022 13:21) (96% - 100%)  Parameters below as of 17 Sep 2022 13:21  Patient On (Oxygen Delivery Method): room air       MEDICATIONS  (STANDING):  atorvastatin 40 milliGRAM(s) Oral at bedtime  heparin   Injectable 5000 Unit(s) SubCutaneous every 8 hours  influenza  Vaccine (HIGH DOSE) 0.7 milliLiter(s) IntraMuscular once    MEDICATIONS  (PRN):  acetaminophen     Tablet .. 650 milliGRAM(s) Oral every 6 hours PRN Mild Pain (1 - 3)  traMADol 50 milliGRAM(s) Oral every 4 hours PRN Moderate Pain (4 - 6)                            13.9   5.73  )-----------( 158      ( 17 Sep 2022 06:55 )             42.3           139  |  103  |  9   ----------------------------<  117<H>  4.3   |  28  |  0.64    Ca    9.4      17 Sep 2022 06:55  Phos  3.9       Mg     2.4         TPro  7.2  /  Alb  3.6  /  TBili  0.8  /  DBili  x   /  AST  21  /  ALT  41  /  AlkPhos  129<H>        PT/INR - ( 17 Sep 2022 06:55 )   PT: 13.4 sec;   INR: 1.12 ratio         PTT - ( 17 Sep 2022 06:55 )  PTT:29.3 sec

## 2022-09-17 NOTE — PROGRESS NOTE ADULT - SUBJECTIVE AND OBJECTIVE BOX
Pt s- new complaints. no pain at present  ICU Vital Signs Last 24 Hrs  T(C): 36.6 (17 Sep 2022 05:05), Max: 36.9 (16 Sep 2022 16:08)  T(F): 97.8 (17 Sep 2022 05:05), Max: 98.4 (16 Sep 2022 16:08)  HR: 60 (17 Sep 2022 05:05) (56 - 67)  BP: 100/61 (17 Sep 2022 05:05) (98/60 - 137/72)  BP(mean): --  ABP: --  ABP(mean): --  RR: 17 (17 Sep 2022 05:05) (17 - 18)  SpO2: 98% (17 Sep 2022 05:05) (96% - 100%)    O2 Parameters below as of 17 Sep 2022 05:05  Patient On (Oxygen Delivery Method): room air        Alert nad  Abd: soft nt nd                        13.9   5.73  )-----------( 158      ( 17 Sep 2022 06:55 )             42.3

## 2022-09-17 NOTE — PROGRESS NOTE ADULT - SUBJECTIVE AND OBJECTIVE BOX
Patient is a 67y old  Female who presents with a chief complaint of Choledocholithiasis (17 Sep 2022 15:31)    patient was seen and examined at bedside   Reports mild pain this morning for a few mins, denies any other complains     INTERVAL HPI/OVERNIGHT EVENTS:  T(C): 36.9 (22 @ 13:21), Max: 36.9 (22 @ 13:21)  HR: 66 (22 @ 13:21) (60 - 67)  BP: 106/62 (22 @ 13:21) (98/60 - 137/72)  RR: 17 (22 @ 13:21) (17 - 18)  SpO2: 97% (22 @ 13:21) (96% - 100%)  Wt(kg): --  I&O's Summary      REVIEW OF SYSTEMS: denies fever, chills, SOB, palpitations, chest pain, nausea, vomiting, diarrhea, constipation, dizziness    MEDICATIONS  (STANDING):  atorvastatin 40 milliGRAM(s) Oral at bedtime  heparin   Injectable 5000 Unit(s) SubCutaneous every 8 hours  influenza  Vaccine (HIGH DOSE) 0.7 milliLiter(s) IntraMuscular once    MEDICATIONS  (PRN):  acetaminophen     Tablet .. 650 milliGRAM(s) Oral every 6 hours PRN Mild Pain (1 - 3)  traMADol 50 milliGRAM(s) Oral every 4 hours PRN Moderate Pain (4 - 6)      PHYSICAL EXAM:  GENERAL: NAD, well-groomed, well-developed  HEAD:  Atraumatic, Normocephalic  NERVOUS SYSTEM:  Alert & Oriented X3, Good concentration; Motor Strength 5/5 B/L upper and lower extremities  CHEST/LUNG: Clear to auscultation bilaterally; No rales, rhonchi, wheezing, or rubs  HEART: Regular rate and rhythm; No murmurs, rubs, or gallops  ABDOMEN: Soft, Nontender, Nondistended; Bowel sounds present  EXTREMITIES:  2+ Peripheral Pulses, No clubbing, cyanosis, or edema  SKIN: No rashes or lesions    LABS:                        13.9   5.73  )-----------( 158      ( 17 Sep 2022 06:55 )             42.3         139  |  103  |  9   ----------------------------<  117<H>  4.3   |  28  |  0.64    Ca    9.4      17 Sep 2022 06:55  Phos  3.9       Mg     2.4         TPro  7.2  /  Alb  3.6  /  TBili  0.8  /  DBili  x   /  AST  21  /  ALT  41  /  AlkPhos  129<H>      PT/INR - ( 17 Sep 2022 06:55 )   PT: 13.4 sec;   INR: 1.12 ratio         PTT - ( 17 Sep 2022 06:55 )  PTT:29.3 sec  Urinalysis Basic - ( 16 Sep 2022 09:34 )    Color: Yellow / Appearance: Clear / S.010 / pH: x  Gluc: x / Ketone: Negative  / Bili: Negative / Urobili: Negative   Blood: x / Protein: Negative / Nitrite: Negative   Leuk Esterase: Negative / RBC: x / WBC x   Sq Epi: x / Non Sq Epi: x / Bacteria: x      CAPILLARY BLOOD GLUCOSE      P

## 2022-09-17 NOTE — PROGRESS NOTE ADULT - ASSESSMENT
1. Abdominal pain  2. Cholelithiasis  3. CBD stone  4. Biliary colic  5. No evidence of biliary obstruction  6. No evidence of cholangitis    Suggestions:    1. Surgical evaluation  2. Obtain MRCP report  3. Diet as tolerated  4. Follow up LFT's  5. ERCP by Dr Nicole / Dr Lyons  6. Avoid NSAID  7. Protonix daily  8. DVT prophylaxis

## 2022-09-17 NOTE — PROGRESS NOTE ADULT - ASSESSMENT
Abdominal pain due to cholelithiasis     Plan:  ERCP on monday   Cont current diet   PPI  Pre-op risk stratification same as yesterday   Cont current DVT ppx

## 2022-09-18 LAB
ALBUMIN SERPL ELPH-MCNC: 3.7 G/DL — SIGNIFICANT CHANGE UP (ref 3.5–5)
ALP SERPL-CCNC: 121 U/L — HIGH (ref 40–120)
ALT FLD-CCNC: 40 U/L DA — SIGNIFICANT CHANGE UP (ref 10–60)
ANION GAP SERPL CALC-SCNC: 9 MMOL/L — SIGNIFICANT CHANGE UP (ref 5–17)
AST SERPL-CCNC: 20 U/L — SIGNIFICANT CHANGE UP (ref 10–40)
BILIRUB SERPL-MCNC: 0.6 MG/DL — SIGNIFICANT CHANGE UP (ref 0.2–1.2)
BUN SERPL-MCNC: 13 MG/DL — SIGNIFICANT CHANGE UP (ref 7–18)
CALCIUM SERPL-MCNC: 9.1 MG/DL — SIGNIFICANT CHANGE UP (ref 8.4–10.5)
CHLORIDE SERPL-SCNC: 105 MMOL/L — SIGNIFICANT CHANGE UP (ref 96–108)
CO2 SERPL-SCNC: 27 MMOL/L — SIGNIFICANT CHANGE UP (ref 22–31)
CREAT SERPL-MCNC: 0.54 MG/DL — SIGNIFICANT CHANGE UP (ref 0.5–1.3)
EGFR: 100 ML/MIN/1.73M2 — SIGNIFICANT CHANGE UP
GLUCOSE SERPL-MCNC: 99 MG/DL — SIGNIFICANT CHANGE UP (ref 70–99)
HCT VFR BLD CALC: 41 % — SIGNIFICANT CHANGE UP (ref 34.5–45)
HGB BLD-MCNC: 13.8 G/DL — SIGNIFICANT CHANGE UP (ref 11.5–15.5)
INR BLD: 1.12 RATIO — SIGNIFICANT CHANGE UP (ref 0.88–1.16)
MAGNESIUM SERPL-MCNC: 2.5 MG/DL — SIGNIFICANT CHANGE UP (ref 1.6–2.6)
MCHC RBC-ENTMCNC: 30.4 PG — SIGNIFICANT CHANGE UP (ref 27–34)
MCHC RBC-ENTMCNC: 33.7 GM/DL — SIGNIFICANT CHANGE UP (ref 32–36)
MCV RBC AUTO: 90.3 FL — SIGNIFICANT CHANGE UP (ref 80–100)
MELD SCORE WITH DIALYSIS: 21 POINTS — SIGNIFICANT CHANGE UP
MELD SCORE WITHOUT DIALYSIS: 8 POINTS — SIGNIFICANT CHANGE UP
NRBC # BLD: 0 /100 WBCS — SIGNIFICANT CHANGE UP (ref 0–0)
PHOSPHATE SERPL-MCNC: 3.7 MG/DL — SIGNIFICANT CHANGE UP (ref 2.5–4.5)
PLATELET # BLD AUTO: 149 K/UL — LOW (ref 150–400)
POTASSIUM SERPL-MCNC: 4 MMOL/L — SIGNIFICANT CHANGE UP (ref 3.5–5.3)
POTASSIUM SERPL-SCNC: 4 MMOL/L — SIGNIFICANT CHANGE UP (ref 3.5–5.3)
PROT SERPL-MCNC: 7 G/DL — SIGNIFICANT CHANGE UP (ref 6–8.3)
PROTHROM AB SERPL-ACNC: 13.3 SEC — SIGNIFICANT CHANGE UP (ref 10.5–13.4)
RBC # BLD: 4.54 M/UL — SIGNIFICANT CHANGE UP (ref 3.8–5.2)
RBC # FLD: 12.6 % — SIGNIFICANT CHANGE UP (ref 10.3–14.5)
SODIUM SERPL-SCNC: 141 MMOL/L — SIGNIFICANT CHANGE UP (ref 135–145)
WBC # BLD: 4.68 K/UL — SIGNIFICANT CHANGE UP (ref 3.8–10.5)
WBC # FLD AUTO: 4.68 K/UL — SIGNIFICANT CHANGE UP (ref 3.8–10.5)

## 2022-09-18 PROCEDURE — 99232 SBSQ HOSP IP/OBS MODERATE 35: CPT

## 2022-09-18 RX ORDER — SENNA PLUS 8.6 MG/1
2 TABLET ORAL AT BEDTIME
Refills: 0 | Status: DISCONTINUED | OUTPATIENT
Start: 2022-09-18 | End: 2022-09-21

## 2022-09-18 RX ORDER — POLYETHYLENE GLYCOL 3350 17 G/17G
17 POWDER, FOR SOLUTION ORAL DAILY
Refills: 0 | Status: DISCONTINUED | OUTPATIENT
Start: 2022-09-18 | End: 2022-09-21

## 2022-09-18 RX ADMIN — HEPARIN SODIUM 5000 UNIT(S): 5000 INJECTION INTRAVENOUS; SUBCUTANEOUS at 21:38

## 2022-09-18 RX ADMIN — HEPARIN SODIUM 5000 UNIT(S): 5000 INJECTION INTRAVENOUS; SUBCUTANEOUS at 07:01

## 2022-09-18 RX ADMIN — ATORVASTATIN CALCIUM 40 MILLIGRAM(S): 80 TABLET, FILM COATED ORAL at 21:38

## 2022-09-18 RX ADMIN — Medication 650 MILLIGRAM(S): at 08:00

## 2022-09-18 RX ADMIN — Medication 650 MILLIGRAM(S): at 07:04

## 2022-09-18 RX ADMIN — HEPARIN SODIUM 5000 UNIT(S): 5000 INJECTION INTRAVENOUS; SUBCUTANEOUS at 13:33

## 2022-09-18 NOTE — PROGRESS NOTE ADULT - SUBJECTIVE AND OBJECTIVE BOX
[   ] ICU                                          [   ] CCU                                      [  X ] Medical Floor      Patient is a 67 year old female with abdominal pain. GI consulted to evaluate.          67 year old female with past medical history significant for constipation, arthritis, and spinal stenosis presents with intermittent epigastric pain, which began last night. Pt reports nausea, chills, NBNB vomiting, and epigastric pain, which prompted her ER visit. Denies fever, diarrhea, hematuria, or dysuria. Pt states that pain radiates to back and bowels, rated as 8/10 in severity. Last bowel movement occurred this morning, denies melena. Reports having 4 similar episodes since 2022. Outpt MRCP showed a 5 mm stone in CBD.      Patient appears comfortable. No new complaints reported, No abdominal pain, N/V, hematemesis, hematochezia, melena, fever, chills, chest pain, SOB, cough or diarrhea reported.      PAIN MANAGEMENT:  Pain Scale:                4 /10  Pain Location:  Epigastric pain      Prior Colonoscopy:  A few years ago      PAST MEDICAL HISTORY  Spinal stenosis  Constipation  Arthritis      PAST SURGICAL HISTORY  Abdominoplasty    section   Varicose vein ligation and stripping   Appendectomy       Allergies    No Known Allergies    Intolerances  None        SOCIAL HISTORY  Advanced Directives:       [  ] Full Code       [  ] DNR  Marital Status:         [  ] M      [  ] S      [  ] D       [  ] W  Children:       [  ] Yes      [  ] No  Occupation:        [  ] Employed       [  ] Unemployed       [  ] Retired  Diet:       [  ] Regular       [  ] PEG feeding          [  ] NG tube feeding  Drug Use:           [  ] Patient denied          [  ] Yes  Alcohol:           [  ] No             [  ] Yes (socially)         [  ] Yes (chronic)  Tobacco:           [  ] Yes           [  ] No      FAMILY HISTORY  [ X ] Heart Disease            [ X ] Diabetes             [ X ] HTN             [  ] Colon Cancer             [  ] Stomach Cancer              [  ] Pancreatic Cancer      VITALS  Vital Signs Last 24 Hrs  T(C): 36.6 (18 Sep 2022 05:15), Max: 37.3 (17 Sep 2022 21:15)  T(F): 97.9 (18 Sep 2022 05:15), Max: 99.2 (17 Sep 2022 21:15)  HR: 54 (18 Sep 2022 05:15) (54 - 66)  BP: 109/57 (18 Sep 2022 05:15) (106/62 - 113/69)   RR: 18 (18 Sep 2022 05:15) (17 - 18)  SpO2: 99% (18 Sep 2022 05:15) (96% - 99%)  Parameters below as of 18 Sep 2022 05:15  Patient On (Oxygen Delivery Method): room air       MEDICATIONS  (STANDING):  atorvastatin 40 milliGRAM(s) Oral at bedtime  heparin   Injectable 5000 Unit(s) SubCutaneous every 8 hours  influenza  Vaccine (HIGH DOSE) 0.7 milliLiter(s) IntraMuscular once    MEDICATIONS  (PRN):  acetaminophen     Tablet .. 650 milliGRAM(s) Oral every 6 hours PRN Mild Pain (1 - 3)  traMADol 50 milliGRAM(s) Oral every 4 hours PRN Moderate Pain (4 - 6)                            13.8   4.68  )-----------( 149      ( 18 Sep 2022 06:55 )             41.0           141  |  105  |  13  ----------------------------<  99  4.0   |  27  |  0.54    Ca    9.1      18 Sep 2022 06:55  Phos  3.7       Mg     2.5         TPro  7.0  /  Alb  3.7  /  TBili  0.6  /  DBili  x   /  AST  20  /  ALT  40  /  AlkPhos  121<H>  18      PT/INR - ( 18 Sep 2022 06:55 )   PT: 13.3 sec;   INR: 1.12 ratio         PTT - ( 17 Sep 2022 06:55 )  PTT:29.3 sec

## 2022-09-18 NOTE — PROGRESS NOTE ADULT - SUBJECTIVE AND OBJECTIVE BOX
Patient is a 67y old  Female who presents with a chief complaint of Choledocholithiasis (18 Sep 2022 12:16)    Patient was seen and examined at bedside   denies any abd pain   Complains of constipation     INTERVAL HPI/OVERNIGHT EVENTS:  T(C): 36.9 (09-18-22 @ 13:28), Max: 37.3 (09-17-22 @ 21:15)  HR: 64 (09-18-22 @ 13:28) (54 - 64)  BP: 111/63 (09-18-22 @ 13:28) (109/57 - 113/69)  RR: 18 (09-18-22 @ 13:28) (18 - 18)  SpO2: 96% (09-18-22 @ 13:28) (96% - 99%)  Wt(kg): --  I&O's Summary      REVIEW OF SYSTEMS: denies fever, chills, SOB, palpitations, chest pain, abdominal pain, nausea, vomiting, diarrhea    MEDICATIONS  (STANDING):  atorvastatin 40 milliGRAM(s) Oral at bedtime  heparin   Injectable 5000 Unit(s) SubCutaneous every 8 hours  influenza  Vaccine (HIGH DOSE) 0.7 milliLiter(s) IntraMuscular once  polyethylene glycol 3350 17 Gram(s) Oral daily  senna 2 Tablet(s) Oral at bedtime    MEDICATIONS  (PRN):  acetaminophen     Tablet .. 650 milliGRAM(s) Oral every 6 hours PRN Mild Pain (1 - 3)  traMADol 50 milliGRAM(s) Oral every 4 hours PRN Moderate Pain (4 - 6)      PHYSICAL EXAM:  GENERAL: NAD, well-groomed, well-developed  HEAD:  Atraumatic, Normocephalic  NERVOUS SYSTEM:  Alert & Oriented X3, Good concentration; Motor Strength 5/5 B/L upper and lower extremities  CHEST/LUNG: Clear to auscultation bilaterally; No rales, rhonchi, wheezing, or rubs  HEART: Regular rate and rhythm; No murmurs, rubs, or gallops  ABDOMEN: Soft, Nontender, Nondistended; Bowel sounds present  EXTREMITIES:  2+ Peripheral Pulses, No clubbing, cyanosis, or edema  SKIN: No rashes or lesions    LABS:                        13.8   4.68  )-----------( 149      ( 18 Sep 2022 06:55 )             41.0     09-18    141  |  105  |  13  ----------------------------<  99  4.0   |  27  |  0.54    Ca    9.1      18 Sep 2022 06:55  Phos  3.7     09-18  Mg     2.5     09-18    TPro  7.0  /  Alb  3.7  /  TBili  0.6  /  DBili  x   /  AST  20  /  ALT  40  /  AlkPhos  121<H>  09-18    PT/INR - ( 18 Sep 2022 06:55 )   PT: 13.3 sec;   INR: 1.12 ratio         PTT - ( 17 Sep 2022 06:55 )  PTT:29.3 sec    CAPILLARY BLOOD GLUCOSE

## 2022-09-18 NOTE — PROGRESS NOTE ADULT - ASSESSMENT
1. Abdominal pain  2. Cholelithiasis  3. CBD stone  4. Biliary colic  5. No evidence of biliary obstruction  6. No evidence of cholangitis    Suggestions:    1. Surgical evaluation  2. Obtain MRCP report  3. Diet as tolerated  4. Follow up LFT's  5. ERCP by Dr Nicole  6. Avoid NSAID  7. Protonix daily  8. DVT prophylaxis

## 2022-09-18 NOTE — PROGRESS NOTE ADULT - ASSESSMENT
Abdominal pain due to cholelithiasis   Constipation    Plan:  ERCP on Monday   Cont current diet, NPO after midnight   PPI  Pre-op risk stratification : RCRI: Class 1 Risk, 3.9% 30-day risk of death, MI or cardiac arrest  Started on bowel regimen  Cont current DVT ppx

## 2022-09-19 ENCOUNTER — TRANSCRIPTION ENCOUNTER (OUTPATIENT)
Age: 67
End: 2022-09-19

## 2022-09-19 DIAGNOSIS — Z02.9 ENCOUNTER FOR ADMINISTRATIVE EXAMINATIONS, UNSPECIFIED: ICD-10-CM

## 2022-09-19 PROCEDURE — 99233 SBSQ HOSP IP/OBS HIGH 50: CPT | Mod: 25

## 2022-09-19 PROCEDURE — 43255 EGD CONTROL BLEEDING ANY: CPT | Mod: 59

## 2022-09-19 PROCEDURE — 76000 FLUOROSCOPY <1 HR PHYS/QHP: CPT | Mod: 26

## 2022-09-19 PROCEDURE — 43264 ERCP REMOVE DUCT CALCULI: CPT

## 2022-09-19 PROCEDURE — 43262 ENDO CHOLANGIOPANCREATOGRAPH: CPT | Mod: 59

## 2022-09-19 DEVICE — HYDRATOME 44: Type: IMPLANTABLE DEVICE | Status: FUNCTIONAL

## 2022-09-19 DEVICE — BLLN EXTRCTR PRO RX-S 9-12MM: Type: IMPLANTABLE DEVICE | Status: FUNCTIONAL

## 2022-09-19 DEVICE — ESOPHAGEAL BALLOON CATH CRE FIXED WIRE 6-7-8MM: Type: IMPLANTABLE DEVICE | Status: FUNCTIONAL

## 2022-09-19 RX ORDER — CIPROFLOXACIN LACTATE 400MG/40ML
400 VIAL (ML) INTRAVENOUS ONCE
Refills: 0 | Status: DISCONTINUED | OUTPATIENT
Start: 2022-09-19 | End: 2022-09-21

## 2022-09-19 RX ORDER — ONDANSETRON 8 MG/1
4 TABLET, FILM COATED ORAL ONCE
Refills: 0 | Status: DISCONTINUED | OUTPATIENT
Start: 2022-09-19 | End: 2022-09-21

## 2022-09-19 RX ORDER — PANTOPRAZOLE SODIUM 20 MG/1
40 TABLET, DELAYED RELEASE ORAL
Refills: 0 | Status: DISCONTINUED | OUTPATIENT
Start: 2022-09-19 | End: 2022-09-20

## 2022-09-19 RX ORDER — HYDROMORPHONE HYDROCHLORIDE 2 MG/ML
0.5 INJECTION INTRAMUSCULAR; INTRAVENOUS; SUBCUTANEOUS
Refills: 0 | Status: DISCONTINUED | OUTPATIENT
Start: 2022-09-19 | End: 2022-09-21

## 2022-09-19 RX ORDER — INDOMETHACIN 50 MG
100 CAPSULE ORAL ONCE
Refills: 0 | Status: COMPLETED | OUTPATIENT
Start: 2022-09-19 | End: 2022-09-19

## 2022-09-19 RX ADMIN — ATORVASTATIN CALCIUM 40 MILLIGRAM(S): 80 TABLET, FILM COATED ORAL at 21:44

## 2022-09-19 RX ADMIN — SENNA PLUS 2 TABLET(S): 8.6 TABLET ORAL at 21:43

## 2022-09-19 RX ADMIN — Medication 100 MILLIGRAM(S): at 14:26

## 2022-09-19 RX ADMIN — HEPARIN SODIUM 5000 UNIT(S): 5000 INJECTION INTRAVENOUS; SUBCUTANEOUS at 05:07

## 2022-09-19 RX ADMIN — Medication 100 MILLIGRAM(S): at 16:10

## 2022-09-19 NOTE — PROGRESS NOTE ADULT - PROBLEM SELECTOR PLAN 3
- Patient came from home  - Disposition likely home pending ERCP result - Patient came from home  - Disposition likely home likely tomorrow to f/u with out/pt Geno lewis

## 2022-09-19 NOTE — PROGRESS NOTE ADULT - SUBJECTIVE AND OBJECTIVE BOX
INTERVAL HPI/OVERNIGHT EVENTS:  Pt stable.   Tolerating diet.   flatus/BM.  No abdominal pain    Vital Signs Last 24 Hrs  T(C): 36.8 (19 Sep 2022 05:20), Max: 36.9 (18 Sep 2022 13:28)  T(F): 98.2 (19 Sep 2022 05:20), Max: 98.5 (18 Sep 2022 13:28)  HR: 55 (19 Sep 2022 05:20) (55 - 64)  BP: 128/70 (19 Sep 2022 05:20) (106/69 - 128/70)  BP(mean): --  RR: 18 (19 Sep 2022 05:20) (18 - 18)  SpO2: 100% (19 Sep 2022 05:20) (95% - 100%)    Parameters below as of 19 Sep 2022 05:20  Patient On (Oxygen Delivery Method): room air        Physical:  Abdomen: Soft nondistended, nontender.    I&O's Summary      LABS:                        13.8   4.68  )-----------( 149      ( 18 Sep 2022 06:55 )             41.0             09-18    141  |  105  |  13  ----------------------------<  99  4.0   |  27  |  0.54    Ca    9.1      18 Sep 2022 06:55  Phos  3.7     09-18  Mg     2.5     09-18    TPro  7.0  /  Alb  3.7  /  TBili  0.6  /  DBili  x   /  AST  20  /  ALT  40  /  AlkPhos  121<H>  09-18

## 2022-09-19 NOTE — PROGRESS NOTE ADULT - SUBJECTIVE AND OBJECTIVE BOX
[   ] ICU                                          [   ] CCU                                      [  X ] Medical Floor    Patient is a 67 year old female with abdominal pain. GI consulted to evaluate.          67 year old female with past medical history significant for constipation, arthritis, and spinal stenosis presents with intermittent epigastric pain, which began last night. Pt reports nausea, chills, NBNB vomiting, and epigastric pain, which prompted her ER visit. Denies fever, diarrhea, hematuria, or dysuria. Pt states that pain radiates to back and bowels, rated as 8/10 in severity. Last bowel movement occurred this morning, denies melena. Reports having 4 similar episodes since 2022. Outpt MRCP showed a 5 mm stone in CBD.      Patient appears comfortable. No new complaints reported, No abdominal pain, N/V, hematemesis, hematochezia, melena, fever, chills, chest pain, SOB, cough or diarrhea reported.      PAIN MANAGEMENT:  Pain Scale:                4 /10  Pain Location:  Epigastric pain      Prior Colonoscopy:  A few years ago      PAST MEDICAL HISTORY  Spinal stenosis  Constipation  Arthritis      PAST SURGICAL HISTORY  Abdominoplasty    section   Varicose vein ligation and stripping   Appendectomy       Allergies    No Known Allergies    Intolerances  None        SOCIAL HISTORY  Advanced Directives:       [  ] Full Code       [  ] DNR  Marital Status:         [  ] M      [  ] S      [  ] D       [  ] W  Children:       [  ] Yes      [  ] No  Occupation:        [  ] Employed       [  ] Unemployed       [  ] Retired  Diet:       [  ] Regular       [  ] PEG feeding          [  ] NG tube feeding  Drug Use:           [  ] Patient denied          [  ] Yes  Alcohol:           [  ] No             [  ] Yes (socially)         [  ] Yes (chronic)  Tobacco:           [  ] Yes           [  ] No      FAMILY HISTORY  [ X ] Heart Disease            [ X ] Diabetes             [ X ] HTN             [  ] Colon Cancer             [  ] Stomach Cancer              [  ] Pancreatic Cancer      VITALS  Vital Signs Last 24 Hrs  T(C): 37.1 (19 Sep 2022 13:42), Max: 37.1 (19 Sep 2022 13:42)  T(F): 98.8 (19 Sep 2022 13:42), Max: 98.8 (19 Sep 2022 13:42)  HR: 71 (19 Sep 2022 13:42) (55 - 71)  BP: 122/74 (19 Sep 2022 13:42) (106/69 - 128/70)   RR: 18 (19 Sep 2022 13:42) (18 - 18)  SpO2: 100% (19 Sep 2022 13:42) (95% - 100%)  Parameters below as of 19 Sep 2022 05:20  Patient On (Oxygen Delivery Method): room air       MEDICATIONS  (STANDING):  atorvastatin 40 milliGRAM(s) Oral at bedtime  ciprofloxacin   IVPB 400 milliGRAM(s) IV Intermittent once  heparin   Injectable 5000 Unit(s) SubCutaneous every 8 hours  indomethacin Suppository 100 milliGRAM(s) Rectal once  influenza  Vaccine (HIGH DOSE) 0.7 milliLiter(s) IntraMuscular once  polyethylene glycol 3350 17 Gram(s) Oral daily  senna 2 Tablet(s) Oral at bedtime    MEDICATIONS  (PRN):  acetaminophen     Tablet .. 650 milliGRAM(s) Oral every 6 hours PRN Mild Pain (1 - 3)  traMADol 50 milliGRAM(s) Oral every 4 hours PRN Moderate Pain (4 - 6)                            13.8   4.68  )-----------( 149      ( 18 Sep 2022 06:55 )             41.0       09-18    141  |  105  |  13  ----------------------------<  99  4.0   |  27  |  0.54    Ca    9.1      18 Sep 2022 06:55  Phos  3.7     -  Mg     2.5         TPro  7.0  /  Alb  3.7  /  TBili  0.6  /  DBili  x   /  AST  20  /  ALT  40  /  AlkPhos  121<H>        PT/INR - ( 18 Sep 2022 06:55 )   PT: 13.3 sec;   INR: 1.12 ratio

## 2022-09-19 NOTE — PROGRESS NOTE ADULT - ASSESSMENT
67y.o. Female with outpatient MRCP results of choledocholithiasis   afebrile, wbc wnl   tbili wnl     - plan for ERCP today with GI  - iv abx  - plan for lap debbie, with IOC on this admission   - pain meds, antiemetic, antipyretic PRN  - discussed and agreed with Dr. Bey

## 2022-09-19 NOTE — PROGRESS NOTE ADULT - SUBJECTIVE AND OBJECTIVE BOX
NP Note discussed with  primary attending    Patient is a 67y old  Female who presents with a chief complaint of Choledocholithiasis (19 Sep 2022 14:01)      INTERVAL HPI/OVERNIGHT EVENTS: no new complaints    MEDICATIONS  (STANDING):  atorvastatin 40 milliGRAM(s) Oral at bedtime  ciprofloxacin   IVPB 400 milliGRAM(s) IV Intermittent once  heparin   Injectable 5000 Unit(s) SubCutaneous every 8 hours  influenza  Vaccine (HIGH DOSE) 0.7 milliLiter(s) IntraMuscular once  polyethylene glycol 3350 17 Gram(s) Oral daily  senna 2 Tablet(s) Oral at bedtime    MEDICATIONS  (PRN):  acetaminophen     Tablet .. 650 milliGRAM(s) Oral every 6 hours PRN Mild Pain (1 - 3)  traMADol 50 milliGRAM(s) Oral every 4 hours PRN Moderate Pain (4 - 6)      __________________________________________________  REVIEW OF SYSTEMS:    CONSTITUTIONAL: No fever,   EYES: no acute visual disturbances  NECK: No pain or stiffness  RESPIRATORY: No cough; No shortness of breath  CARDIOVASCULAR: No chest pain, no palpitations  GASTROINTESTINAL: No pain. No nausea or vomiting; No diarrhea   NEUROLOGICAL: No headache or numbness, no tremors  MUSCULOSKELETAL: No joint pain, no muscle pain  GENITOURINARY: no dysuria, no frequency, no hesitancy  PSYCHIATRY: no depression , no anxiety  ALL OTHER  ROS negative        Vital Signs Last 24 Hrs  T(C): 37.1 (19 Sep 2022 13:42), Max: 37.1 (19 Sep 2022 13:42)  T(F): 98.8 (19 Sep 2022 13:42), Max: 98.8 (19 Sep 2022 13:42)  HR: 71 (19 Sep 2022 13:42) (55 - 71)  BP: 122/74 (19 Sep 2022 13:42) (106/69 - 128/70)  BP(mean): --  RR: 18 (19 Sep 2022 13:42) (18 - 18)  SpO2: 100% (19 Sep 2022 13:42) (95% - 100%)    Parameters below as of 19 Sep 2022 05:20  Patient On (Oxygen Delivery Method): room air        ________________________________________________  PHYSICAL EXAM:  GENERAL: NAD  HEENT: Normocephalic;  conjunctivae and sclerae clear; moist mucous membranes;   NECK : supple  CHEST/LUNG: Clear to ausculitation bilaterally with good air entry   HEART: S1 S2  regular; no murmurs, gallops or rubs  ABDOMEN: Soft, Nontender, Nondistended; Bowel sounds present  EXTREMITIES: no cyanosis; no edema; no calf tenderness  SKIN: warm and dry; no rash  NERVOUS SYSTEM:  Awake and alert; Oriented  to place, person and time ; no new deficits    _________________________________________________  LABS:                        13.8   4.68  )-----------( 149      ( 18 Sep 2022 06:55 )             41.0     09-18    141  |  105  |  13  ----------------------------<  99  4.0   |  27  |  0.54    Ca    9.1      18 Sep 2022 06:55  Phos  3.7     09-18  Mg     2.5     09-18    TPro  7.0  /  Alb  3.7  /  TBili  0.6  /  DBili  x   /  AST  20  /  ALT  40  /  AlkPhos  121<H>  09-18    PT/INR - ( 18 Sep 2022 06:55 )   PT: 13.3 sec;   INR: 1.12 ratio             CAPILLARY BLOOD GLUCOSE            RADIOLOGY & ADDITIONAL TESTS:    Imaging Personally Reviewed:  YES    Consultant(s) Notes Reviewed:   YES    Care Discussed with Consultants :     Plan of care was discussed with patient and /or primary care giver; all questions and concerns were addressed and care was aligned with patient's wishes.

## 2022-09-19 NOTE — PROGRESS NOTE ADULT - ASSESSMENT
Patient is a 68 y/o female with PMH of constipation and spinal stenosis presents with abdominal pain. Upon evaluation in the ED, patient hemodynamically stable, outpatient MRCP records showing 5mm stone in the CBD. Patient admitted to medicine for Choledocholithiasis. General surgery consulted, Dr bonner, recommends laparoscopic cholecystectomy planning after GI intervention. GI Dr. Gilmore consulted. Patient is scheduled for ERCP later today.  Patient is a 66 y/o female with PMH of constipation and spinal stenosis presents with abdominal pain. Upon evaluation in the ED, patient hemodynamically stable, outpatient MRCP records showing 5mm stone in the CBD. Patient admitted to medicine for Choledocholithiasis. General surgery consulted, Dr bonner, recommends laparoscopic cholecystectomy planning after GI intervention. GI Dr. Gilmore consulted. Patient is s/p ERCP  today, which shows   choledocholithiasis, gastritis, with s/p sphincterotomy and stone extraction.      Patient is a 68 y/o female with PMH of constipation and spinal stenosis presents with abdominal pain. Upon evaluation in the ED, patient hemodynamically stable, outpatient MRCP records showing 5mm stone in the CBD. Patient admitted to medicine for Choledocholithiasis. General surgery consulted, Dr bonner, recommends laparoscopic cholecystectomy planning after GI intervention. GI Dr. Gilmore consulted. Patient is s/p ERCP  today, which shows   choledocholithiasis, gastritis, with s/p sphincterotomy and stone extraction. GI is recommending Lap debbie.

## 2022-09-19 NOTE — PROGRESS NOTE ADULT - SUBJECTIVE AND OBJECTIVE BOX
Patient is a 67y old  Female who presents with a chief complaint of Choledocholithiasis (19 Sep 2022 14:01)    Patient was seen and examined at bedside   Reports being anxious about the procedure  Denies any abd pain      INTERVAL HPI/OVERNIGHT EVENTS:  T(C): 37.1 (09-19-22 @ 13:42), Max: 37.1 (09-19-22 @ 13:42)  HR: 71 (09-19-22 @ 13:42) (55 - 71)  BP: 122/74 (09-19-22 @ 13:42) (106/69 - 128/70)  RR: 18 (09-19-22 @ 13:42) (18 - 18)  SpO2: 100% (09-19-22 @ 13:42) (95% - 100%)  Wt(kg): --  I&O's Summary      REVIEW OF SYSTEMS: denies fever, chills, SOB, palpitations, chest pain, abdominal pain, nausea, vomiting, diarrhea, constipation, dizziness    MEDICATIONS  (STANDING):  atorvastatin 40 milliGRAM(s) Oral at bedtime  ciprofloxacin   IVPB 400 milliGRAM(s) IV Intermittent once  heparin   Injectable 5000 Unit(s) SubCutaneous every 8 hours  influenza  Vaccine (HIGH DOSE) 0.7 milliLiter(s) IntraMuscular once  polyethylene glycol 3350 17 Gram(s) Oral daily  senna 2 Tablet(s) Oral at bedtime    MEDICATIONS  (PRN):  acetaminophen     Tablet .. 650 milliGRAM(s) Oral every 6 hours PRN Mild Pain (1 - 3)  traMADol 50 milliGRAM(s) Oral every 4 hours PRN Moderate Pain (4 - 6)      PHYSICAL EXAM:  GENERAL: NAD, well-groomed, well-developed  HEAD:  Atraumatic, Normocephalic  NERVOUS SYSTEM:  Alert & Oriented X3, Good concentration; Motor Strength 5/5 B/L upper and lower extremities  CHEST/LUNG: Clear to auscultation bilaterally; No rales, rhonchi, wheezing, or rubs  HEART: Regular rate and rhythm; No murmurs, rubs, or gallops  ABDOMEN: Soft, Nontender, Nondistended; Bowel sounds present  EXTREMITIES:  2+ Peripheral Pulses, No clubbing, cyanosis, or edema  SKIN: No rashes or lesions    LABS:                        13.8   4.68  )-----------( 149      ( 18 Sep 2022 06:55 )             41.0     09-18    141  |  105  |  13  ----------------------------<  99  4.0   |  27  |  0.54    Ca    9.1      18 Sep 2022 06:55  Phos  3.7     09-18  Mg     2.5     09-18    TPro  7.0  /  Alb  3.7  /  TBili  0.6  /  DBili  x   /  AST  20  /  ALT  40  /  AlkPhos  121<H>  09-18    PT/INR - ( 18 Sep 2022 06:55 )   PT: 13.3 sec;   INR: 1.12 ratio             CAPILLARY BLOOD GLUCOSE

## 2022-09-19 NOTE — PROGRESS NOTE ADULT - MUSCULOSKELETAL
ROM intact/no joint swelling/no joint erythema/no joint warmth/no calf tenderness/normal gait details…

## 2022-09-19 NOTE — PROGRESS NOTE ADULT - PROBLEM SELECTOR PLAN 1
- MRCP revealed 5 mm stone in CBD  - AlkPhos 129, lipase and LFTs WNL  - total bili 0.6  - afebrile no leukocytosis, normal bili  - pain management acetaminophen for mild pain and tramadol for moderate pain  - hold abx no concern for cholangitis   - GI consulted, plan for ERCP today 9/19  - Surgery following recs Lap Colleen after GI intervention - MRCP revealed 5 mm stone in CBD  - AlkPhos 129, lipase and LFTs WNL  - total bili 0.6  - afebrile no leukocytosis, normal bili  - pain management acetaminophen for mild pain and tramadol for moderate pain  - hold abx no concern for cholangitis   - GI consulted, s/p ERCP today 9/19  - Surgery following recs Lap Colleen after GI intervention

## 2022-09-19 NOTE — PROGRESS NOTE ADULT - SUBJECTIVE AND OBJECTIVE BOX
INTERVAL HPI/OVERNIGHT EVENTS:  Patient seen and examined at bedside   Pt resting comfortably. No acute complaints.   Tolerating diet. Denies N/V, abdominal pain.   Patient NPO today for planned ERCP with GI.   Patient denies chest pain, shortness of breath, fever, chills or any other constitutional symptoms.     Admits to past surgical history: abdominoplasty, umbilical hernia repair, appendectomy     Vincentian : [id number]    MEDICATIONS  (STANDING):  atorvastatin 40 milliGRAM(s) Oral at bedtime  ciprofloxacin   IVPB 400 milliGRAM(s) IV Intermittent once  heparin   Injectable 5000 Unit(s) SubCutaneous every 8 hours  indomethacin Suppository 100 milliGRAM(s) Rectal once  influenza  Vaccine (HIGH DOSE) 0.7 milliLiter(s) IntraMuscular once  polyethylene glycol 3350 17 Gram(s) Oral daily  senna 2 Tablet(s) Oral at bedtime    MEDICATIONS  (PRN):  acetaminophen     Tablet .. 650 milliGRAM(s) Oral every 6 hours PRN Mild Pain (1 - 3)  traMADol 50 milliGRAM(s) Oral every 4 hours PRN Moderate Pain (4 - 6)      Vital Signs Last 24 Hrs  T(C): 36.8 (19 Sep 2022 05:20), Max: 36.9 (18 Sep 2022 13:28)  T(F): 98.2 (19 Sep 2022 05:20), Max: 98.5 (18 Sep 2022 13:28)  HR: 55 (19 Sep 2022 05:20) (55 - 64)  BP: 128/70 (19 Sep 2022 05:20) (106/69 - 128/70)  BP(mean): --  RR: 18 (19 Sep 2022 05:20) (18 - 18)  SpO2: 100% (19 Sep 2022 05:20) (95% - 100%)    Parameters below as of 19 Sep 2022 05:20  Patient On (Oxygen Delivery Method): room air        Physical:  General: A&Ox3. NAD.  Respiratory: non labored   Skin: no jaundice, no icterus  Abdomen: Soft, nondistended, nontender   : no lópez in place, voiding       I&O's Detail      LABS:                        13.8   4.68  )-----------( 149      ( 18 Sep 2022 06:55 )             41.0             09-18    141  |  105  |  13  ----------------------------<  99  4.0   |  27  |  0.54    Ca    9.1      18 Sep 2022 06:55  Phos  3.7     09-18  Mg     2.5     09-18    TPro  7.0  /  Alb  3.7  /  TBili  0.6  /  DBili  x   /  AST  20  /  ALT  40  /  AlkPhos  121<H>  09-18    PT/INR - ( 18 Sep 2022 06:55 )   PT: 13.3 sec;   INR: 1.12 ratio

## 2022-09-19 NOTE — PROGRESS NOTE ADULT - ASSESSMENT
Abdominal pain due to cholelithiasis   Constipation    Plan:  ERCP today  NPO  Started on Ciprofloxacin by GI  PPI  Pre-op risk stratification : RCRI: Class 1 Risk, 3.9% 30-day risk of death, MI or cardiac arrest  Cont bowel regimen  Monitor Hb and LFTs '  Cont DVT ppx post procedure

## 2022-09-19 NOTE — PROGRESS NOTE ADULT - ASSESSMENT
1. Abdominal pain  2. Cholelithiasis  3. CBD stone  4. Biliary colic  5. No evidence of biliary obstruction  6. No evidence of cholangitis    Suggestions:    1. Surgical evaluation  2. ERCP by Dr Nicole  3. Diet as tolerated  4. Follow up LFT's  5. ERCP by Dr Nicole  6. Avoid NSAID  7. Protonix daily  8. DVT prophylaxis

## 2022-09-20 ENCOUNTER — TRANSCRIPTION ENCOUNTER (OUTPATIENT)
Age: 67
End: 2022-09-20

## 2022-09-20 LAB
ALBUMIN SERPL ELPH-MCNC: 3.3 G/DL — LOW (ref 3.5–5)
ALP SERPL-CCNC: 120 U/L — SIGNIFICANT CHANGE UP (ref 40–120)
ALT FLD-CCNC: 53 U/L DA — SIGNIFICANT CHANGE UP (ref 10–60)
ANION GAP SERPL CALC-SCNC: 6 MMOL/L — SIGNIFICANT CHANGE UP (ref 5–17)
AST SERPL-CCNC: 32 U/L — SIGNIFICANT CHANGE UP (ref 10–40)
BILIRUB DIRECT SERPL-MCNC: 0.2 MG/DL — SIGNIFICANT CHANGE UP (ref 0–0.3)
BILIRUB SERPL-MCNC: 0.6 MG/DL — SIGNIFICANT CHANGE UP (ref 0.2–1.2)
BUN SERPL-MCNC: 18 MG/DL — SIGNIFICANT CHANGE UP (ref 7–18)
CALCIUM SERPL-MCNC: 9.4 MG/DL — SIGNIFICANT CHANGE UP (ref 8.4–10.5)
CHLORIDE SERPL-SCNC: 105 MMOL/L — SIGNIFICANT CHANGE UP (ref 96–108)
CO2 SERPL-SCNC: 28 MMOL/L — SIGNIFICANT CHANGE UP (ref 22–31)
CREAT SERPL-MCNC: 0.95 MG/DL — SIGNIFICANT CHANGE UP (ref 0.5–1.3)
EGFR: 65 ML/MIN/1.73M2 — SIGNIFICANT CHANGE UP
GLUCOSE SERPL-MCNC: 124 MG/DL — HIGH (ref 70–99)
HCT VFR BLD CALC: 41.4 % — SIGNIFICANT CHANGE UP (ref 34.5–45)
HGB BLD-MCNC: 14.3 G/DL — SIGNIFICANT CHANGE UP (ref 11.5–15.5)
MCHC RBC-ENTMCNC: 30.6 PG — SIGNIFICANT CHANGE UP (ref 27–34)
MCHC RBC-ENTMCNC: 34.5 GM/DL — SIGNIFICANT CHANGE UP (ref 32–36)
MCV RBC AUTO: 88.5 FL — SIGNIFICANT CHANGE UP (ref 80–100)
NRBC # BLD: 0 /100 WBCS — SIGNIFICANT CHANGE UP (ref 0–0)
PLATELET # BLD AUTO: 151 K/UL — SIGNIFICANT CHANGE UP (ref 150–400)
POTASSIUM SERPL-MCNC: 4.3 MMOL/L — SIGNIFICANT CHANGE UP (ref 3.5–5.3)
POTASSIUM SERPL-SCNC: 4.3 MMOL/L — SIGNIFICANT CHANGE UP (ref 3.5–5.3)
PROT SERPL-MCNC: 7.2 G/DL — SIGNIFICANT CHANGE UP (ref 6–8.3)
RBC # BLD: 4.68 M/UL — SIGNIFICANT CHANGE UP (ref 3.8–5.2)
RBC # FLD: 12.5 % — SIGNIFICANT CHANGE UP (ref 10.3–14.5)
SARS-COV-2 RNA SPEC QL NAA+PROBE: SIGNIFICANT CHANGE UP
SODIUM SERPL-SCNC: 139 MMOL/L — SIGNIFICANT CHANGE UP (ref 135–145)
WBC # BLD: 7.61 K/UL — SIGNIFICANT CHANGE UP (ref 3.8–10.5)
WBC # FLD AUTO: 7.61 K/UL — SIGNIFICANT CHANGE UP (ref 3.8–10.5)

## 2022-09-20 PROCEDURE — 99232 SBSQ HOSP IP/OBS MODERATE 35: CPT

## 2022-09-20 PROCEDURE — 99233 SBSQ HOSP IP/OBS HIGH 50: CPT

## 2022-09-20 RX ORDER — PANTOPRAZOLE SODIUM 20 MG/1
40 TABLET, DELAYED RELEASE ORAL
Refills: 0 | Status: DISCONTINUED | OUTPATIENT
Start: 2022-09-20 | End: 2022-09-21

## 2022-09-20 RX ORDER — CHLORHEXIDINE GLUCONATE 213 G/1000ML
1 SOLUTION TOPICAL DAILY
Refills: 0 | Status: DISCONTINUED | OUTPATIENT
Start: 2022-09-20 | End: 2022-09-21

## 2022-09-20 RX ADMIN — HEPARIN SODIUM 5000 UNIT(S): 5000 INJECTION INTRAVENOUS; SUBCUTANEOUS at 21:04

## 2022-09-20 RX ADMIN — ATORVASTATIN CALCIUM 40 MILLIGRAM(S): 80 TABLET, FILM COATED ORAL at 21:04

## 2022-09-20 RX ADMIN — SENNA PLUS 2 TABLET(S): 8.6 TABLET ORAL at 21:04

## 2022-09-20 RX ADMIN — PANTOPRAZOLE SODIUM 40 MILLIGRAM(S): 20 TABLET, DELAYED RELEASE ORAL at 19:46

## 2022-09-20 NOTE — DISCHARGE NOTE PROVIDER - CARE PROVIDERS DIRECT ADDRESSES
,DirectAddress_Unknown,DirectAddress_Unknown ,DirectAddress_Unknown,DirectAddress_Unknown,[email or web address]

## 2022-09-20 NOTE — PROGRESS NOTE ADULT - SKIN
warm and dry/no rashes/no ulcers
warm and dry/no rashes/no ulcers
warm and dry/color normal/no rashes/no ulcers
warm and dry/pale/no rashes/no ulcers

## 2022-09-20 NOTE — DISCHARGE NOTE PROVIDER - NSDCCPCAREPLAN_GEN_ALL_CORE_FT
PRINCIPAL DISCHARGE DIAGNOSIS  Diagnosis: Choledocholithiasis  Assessment and Plan of Treatment: You were admitted with severe epigastric pain, associated nausea & vomitting. Your outpatient MRCP result revealed a 5 mm stone in the gall bladder duct. General surgery & gatrointestinal doctors were consulted. GI recommended an ERCP. On 9/19/22, you underwent anERCP by te GI doctor which also confirms that puentes have a gallstone in your gall bladder duct. A sphincterotomy with stone extraction was also done during ERCP.   As per surgery recommendation, you underwent a Laparascopic Cholecystectomy on ????????????.   You are being discharged home with ????????? to follow up in out patient   with GI doctor DR. Gilmore & surgery team.       PRINCIPAL DISCHARGE DIAGNOSIS  Diagnosis: Choledocholithiasis  Assessment and Plan of Treatment: You were admitted with severe epigastric pain, associated nausea & vomitting. Your outpatient MRCP result revealed a 5 mm stone in the gall bladder duct. General surgery & gatrointestinal doctors were consulted. GI recommended an ERCP. On 9/19/22, you underwent an ERCP by te GI doctor which also confirms that puentes have a gallstone in your gall bladder duct. A sphincterotomy with stone extraction was also done during ERCP.   Choledocholithiasis is the presence of gallstones in the common bile duct. This condition can cause jaundice (yellowing of the skin) and liver cell damage.  As per surgery recommendation, you were scheduled to have a Laparascopic Cholecystectomy on 9/21 but it was cancelled. You were given option to either stay & do it the following day or follow up with Dr. bey for out patient Cholecystectomy. However, you & your spouse decided to follow up with Dr Bey in out patiient for the surgery.   Therefore, you are being discharged home to follow up with Dr. Bey in 1 week

## 2022-09-20 NOTE — PROGRESS NOTE ADULT - ASSESSMENT
Patient is a 68 y/o female with PMH of constipation and spinal stenosis presents with abdominal pain. Upon evaluation in the ED, patient hemodynamically stable, outpatient MRCP records showing 5mm stone in the CBD. Patient admitted to medicine for Choledocholithiasis. General surgery consulted, Dr bonner, recommends laparoscopic cholecystectomy planning after GI intervention. GI Dr. Gilmore consulted. Patient is s/p ERCP  today, which shows   choledocholithiasis, gastritis, with s/p sphincterotomy and stone extraction.  Lap debbie was rescheduled for tomorrow 9/21. Pt will be NPO after Midnight.

## 2022-09-20 NOTE — PROGRESS NOTE ADULT - SUBJECTIVE AND OBJECTIVE BOX
NP Note discussed with  primary attending    Patient is a 67y old  Female who presents with a chief complaint of Choledocholithiasis (20 Sep 2022 12:56)      INTERVAL HPI/OVERNIGHT EVENTS: no new complaints    MEDICATIONS  (STANDING):  atorvastatin 40 milliGRAM(s) Oral at bedtime  ciprofloxacin   IVPB 400 milliGRAM(s) IV Intermittent once  heparin   Injectable 5000 Unit(s) SubCutaneous every 8 hours  influenza  Vaccine (HIGH DOSE) 0.7 milliLiter(s) IntraMuscular once  pantoprazole    Tablet 40 milliGRAM(s) Oral two times a day  polyethylene glycol 3350 17 Gram(s) Oral daily  senna 2 Tablet(s) Oral at bedtime    MEDICATIONS  (PRN):  acetaminophen     Tablet .. 650 milliGRAM(s) Oral every 6 hours PRN Mild Pain (1 - 3)  HYDROmorphone  Injectable 0.5 milliGRAM(s) IV Push every 10 minutes PRN Moderate Pain (4 - 6)  ondansetron Injectable 4 milliGRAM(s) IV Push once PRN Nausea and/or Vomiting  traMADol 50 milliGRAM(s) Oral every 4 hours PRN Moderate Pain (4 - 6)      __________________________________________________  REVIEW OF SYSTEMS:    CONSTITUTIONAL: No fever,   EYES: no acute visual disturbances  NECK: No pain or stiffness  RESPIRATORY: No cough; No shortness of breath  CARDIOVASCULAR: No chest pain, no palpitations  GASTROINTESTINAL: No pain. No nausea or vomiting; No diarrhea   NEUROLOGICAL: No headache or numbness, no tremors  MUSCULOSKELETAL: No joint pain, no muscle pain  GENITOURINARY: no dysuria, no frequency, no hesitancy  PSYCHIATRY: no depression , no anxiety  ALL OTHER  ROS negative        Vital Signs Last 24 Hrs  T(C): 37.1 (20 Sep 2022 13:48), Max: 37.1 (20 Sep 2022 13:48)  T(F): 98.8 (20 Sep 2022 13:48), Max: 98.8 (20 Sep 2022 13:48)  HR: 58 (20 Sep 2022 13:48) (58 - 63)  BP: 103/62 (20 Sep 2022 13:48) (103/62 - 109/65)  BP(mean): --  RR: 16 (20 Sep 2022 13:48) (16 - 17)  SpO2: 98% (20 Sep 2022 13:48) (97% - 98%)    Parameters below as of 20 Sep 2022 13:48  Patient On (Oxygen Delivery Method): room air        ________________________________________________  PHYSICAL EXAM:  GENERAL: NAD  HEENT: Normocephalic;  conjunctivae and sclerae clear; moist mucous membranes;   NECK : supple  CHEST/LUNG: Clear to ausculitation bilaterally with good air entry   HEART: S1 S2  regular; no murmurs, gallops or rubs  ABDOMEN: Soft, Nontender, Nondistended; Bowel sounds present  EXTREMITIES: no cyanosis; no edema; no calf tenderness  SKIN: warm and dry; no rash  NERVOUS SYSTEM:  Awake and alert; Oriented  to place, person and time ; no new deficits    _________________________________________________  LABS:                        14.3   7.61  )-----------( 151      ( 20 Sep 2022 07:30 )             41.4     09-20    139  |  105  |  18  ----------------------------<  124<H>  4.3   |  28  |  0.95    Ca    9.4      20 Sep 2022 07:30    TPro  7.2  /  Alb  3.3<L>  /  TBili  0.6  /  DBili  0.2  /  AST  32  /  ALT  53  /  AlkPhos  120  09-20        CAPILLARY BLOOD GLUCOSE            RADIOLOGY & ADDITIONAL TESTS:    Imaging Personally Reviewed:  YES    Consultant(s) Notes Reviewed:   YES    Care Discussed with Consultants :     Plan of care was discussed with patient and /or primary care giver; all questions and concerns were addressed and care was aligned with patient's wishes.

## 2022-09-20 NOTE — PROGRESS NOTE ADULT - PROBLEM SELECTOR PLAN 2
- Heparin for DVT prophylaxis, held today for Geno Macias in AM
- Heparin for DVT prophylaxis, held today for ERCP

## 2022-09-20 NOTE — PROGRESS NOTE ADULT - ASSESSMENT
67y.o. Female with outpatient MRCP results of choledocholithiasis   S/p ERCP 9/19, sphincterotomy, stone extraction 9/19    -Lap debbie planning this admission   -Pain meds, antiemetic, antipyretic PRN  -PPI   -Discussed with Dr. Bey who agrees

## 2022-09-20 NOTE — PROGRESS NOTE ADULT - SUBJECTIVE AND OBJECTIVE BOX
[   ] ICU                                          [   ] CCU                                      [ X  ] Medical Floor      Patient is a 67 year old female with abdominal pain. GI consulted to evaluate.          67 year old female with past medical history significant for constipation, arthritis, and spinal stenosis presents with intermittent epigastric pain, which began last night. Pt reports nausea, chills, NBNB vomiting, and epigastric pain, which prompted her ER visit. Denies fever, diarrhea, hematuria, or dysuria. Pt states that pain radiates to back and bowels, rated as 8/10 in severity. Last bowel movement occurred this morning, denies melena. Reports having 4 similar episodes since 2022. Outpt MRCP showed a 5 mm stone in CBD.      Patient appears comfortable. No new complaints reported, No abdominal pain, N/V, hematemesis, hematochezia, melena, fever, chills, chest pain, SOB, cough or diarrhea reported.      PAIN MANAGEMENT:  Pain Scale:                4 /10  Pain Location:  Epigastric pain      Prior Colonoscopy:  A few years ago      PAST MEDICAL HISTORY  Spinal stenosis  Constipation  Arthritis      PAST SURGICAL HISTORY  Abdominoplasty    section   Varicose vein ligation and stripping   Appendectomy       Allergies    No Known Allergies    Intolerances  None        SOCIAL HISTORY  Advanced Directives:       [  ] Full Code       [  ] DNR  Marital Status:         [  ] M      [  ] S      [  ] D       [  ] W  Children:       [  ] Yes      [  ] No  Occupation:        [  ] Employed       [  ] Unemployed       [  ] Retired  Diet:       [  ] Regular       [  ] PEG feeding          [  ] NG tube feeding  Drug Use:           [  ] Patient denied          [  ] Yes  Alcohol:           [  ] No             [  ] Yes (socially)         [  ] Yes (chronic)  Tobacco:           [  ] Yes           [  ] No      FAMILY HISTORY  [ X ] Heart Disease            [ X ] Diabetes             [ X ] HTN             [  ] Colon Cancer             [  ] Stomach Cancer              [  ] Pancreatic Cancer      VITALS  Vital Signs Last 24 Hrs  T(C): 36.5 (20 Sep 2022 05:10), Max: 37.1 (19 Sep 2022 13:42)  T(F): 97.7 (20 Sep 2022 05:10), Max: 98.8 (19 Sep 2022 13:42)  HR: 61 (20 Sep 2022 05:10) (53 - 80)  BP: 106/54 (20 Sep 2022 05:10) (106/54 - 134/83)   RR: 17 (20 Sep 2022 05:10) (16 - 20)  SpO2: 97% (20 Sep 2022 05:10) (96% - 100%)  Parameters below as of 20 Sep 2022 05:10  Patient On (Oxygen Delivery Method): room air         MEDICATIONS  (STANDING):  atorvastatin 40 milliGRAM(s) Oral at bedtime  ciprofloxacin   IVPB 400 milliGRAM(s) IV Intermittent once  heparin   Injectable 5000 Unit(s) SubCutaneous every 8 hours  influenza  Vaccine (HIGH DOSE) 0.7 milliLiter(s) IntraMuscular once  pantoprazole    Tablet 40 milliGRAM(s) Oral before breakfast  polyethylene glycol 3350 17 Gram(s) Oral daily  senna 2 Tablet(s) Oral at bedtime    MEDICATIONS  (PRN):  acetaminophen     Tablet .. 650 milliGRAM(s) Oral every 6 hours PRN Mild Pain (1 - 3)  HYDROmorphone  Injectable 0.5 milliGRAM(s) IV Push every 10 minutes PRN Moderate Pain (4 - 6)  ondansetron Injectable 4 milliGRAM(s) IV Push once PRN Nausea and/or Vomiting  traMADol 50 milliGRAM(s) Oral every 4 hours PRN Moderate Pain (4 - 6)                            14.3   7.61  )-----------( 151      ( 20 Sep 2022 07:30 )             41.4       20    139  |  105  |  18  ----------------------------<  124<H>  4.3   |  28  |  0.95    Ca    9.4      20 Sep 2022 07:30    TPro  7.2  /  Alb  3.3<L>  /  TBili  0.6  /  DBili  0.2  /  AST  32  /  ALT  53  /  AlkPhos  120

## 2022-09-20 NOTE — PROGRESS NOTE ADULT - SUBJECTIVE AND OBJECTIVE BOX
INTERVAL HPI/OVERNIGHT EVENTS:  Pt seen and examined at bedside.   Pt resting comfortably. No acute complaints.   Denies abdominal pain   S/p ERCP yesterday 9/19  NPO  Denies N/V    MEDICATIONS  (STANDING):  atorvastatin 40 milliGRAM(s) Oral at bedtime  ciprofloxacin   IVPB 400 milliGRAM(s) IV Intermittent once  heparin   Injectable 5000 Unit(s) SubCutaneous every 8 hours  influenza  Vaccine (HIGH DOSE) 0.7 milliLiter(s) IntraMuscular once  pantoprazole    Tablet 40 milliGRAM(s) Oral two times a day  polyethylene glycol 3350 17 Gram(s) Oral daily  senna 2 Tablet(s) Oral at bedtime    MEDICATIONS  (PRN):  acetaminophen     Tablet .. 650 milliGRAM(s) Oral every 6 hours PRN Mild Pain (1 - 3)  HYDROmorphone  Injectable 0.5 milliGRAM(s) IV Push every 10 minutes PRN Moderate Pain (4 - 6)  ondansetron Injectable 4 milliGRAM(s) IV Push once PRN Nausea and/or Vomiting  traMADol 50 milliGRAM(s) Oral every 4 hours PRN Moderate Pain (4 - 6)      Vital Signs Last 24 Hrs  T(C): 36.5 (20 Sep 2022 05:10), Max: 37.1 (19 Sep 2022 13:42)  T(F): 97.7 (20 Sep 2022 05:10), Max: 98.8 (19 Sep 2022 13:42)  HR: 61 (20 Sep 2022 05:10) (53 - 80)  BP: 106/54 (20 Sep 2022 05:10) (106/54 - 134/83)  BP(mean): --  RR: 17 (20 Sep 2022 05:10) (16 - 20)  SpO2: 97% (20 Sep 2022 05:10) (96% - 100%)    Parameters below as of 20 Sep 2022 05:10  Patient On (Oxygen Delivery Method): room air        Physical:  General: A&Ox3. NAD.  Respirations: Unlabored  Abdomen: Soft nondistended, nontender. No rebound, no guarding, no peritonitis     I&O's Detail      LABS:                        14.3   7.61  )-----------( 151      ( 20 Sep 2022 07:30 )             41.4             09-20    139  |  105  |  18  ----------------------------<  124<H>  4.3   |  28  |  0.95    Ca    9.4      20 Sep 2022 07:30    TPro  7.2  /  Alb  3.3<L>  /  TBili  0.6  /  DBili  0.2  /  AST  32  /  ALT  53  /  AlkPhos  120  09-20    < from: ERCP (09.19.22 @ 13:38) >      Choledocholithiasis: 574.50        Procedure:        The procedure, indications, preparation and potential complications were    explained to the patient, who indicated understanding and signed the    corresponding consent forms. IV anesthesia was administered by anesthesiologist.    Continuous pulse oximetry and blood pressure monitoring were used throughout the    procedure. Supplemental oxygen was used. Patient was placed in prone position.    The duodenoscope was introduced through mouth and advanced under direct    visualization until ampulla was reached. Patient tolerance to the procedure was    excellent. The procedure was not difficult. Blood loss was none.        Limitations/Complications:        There were no apparent limitations or complications        ERCP Findings:        The duodenoscope was passed into thesecond portion of the duodenum. The major    papilla was identified and appeared unremarkable. The Esophagus was normal. Mild    antral gastritis noted. Shelbi ampullary diverticulum. CBD cannulated with 44    tome. 8 mm stone visualized. Large sphincterotomy performed.Balloon used to    extract stone. No remaining stones seen. Intrahepatics normal. PD not    cannulated. Minor oozing tamponaded with balloon        Impressions:        Choledocholithiasis, gastritis, s/p sphincterotomy and stone extraction.        Plan:        PPI BID for 2 weeks Clears tonight No NSAIDS for 14 day s Needs Lap debbie        Clyde Nicole        Version 1, Electronically signed on 9/19/2022 3:36:50 PM by Clyde Nicole    < end of copied text >

## 2022-09-20 NOTE — PROGRESS NOTE ADULT - PROBLEM SELECTOR PLAN 1
- MRCP revealed 5 mm stone in CBD  - AlkPhos 129, lipase and LFTs WNL  - total bili 0.6  - afebrile no leukocytosis, normal bili  - pain management acetaminophen for mild pain and tramadol for moderate pain  - hold abx no concern for cholangitis   - GI consulted, s/p ERCP today 9/19  - Plan is for Lap Colleen tomorrow 9/21, NPO after MN  - Surgery following

## 2022-09-20 NOTE — PROGRESS NOTE ADULT - ASSESSMENT
67 year old female with past medical history significant for constipation, arthritis, and spinal stenosis presents with intermittent epigastric pain.  Outpt MRCP showed a 5 mm stone in CBD.    [facility]vance GI consulted for ERCP

## 2022-09-20 NOTE — DISCHARGE NOTE PROVIDER - HOSPITAL COURSE
Patient is a 66 y/o female with PMH of constipation and spinal stenosis presents with abdominal pain. Upon evaluation in the ED, patient hemodynamically stable, outpatient MRCP records showing 5mm stone in the CBD. Patient admitted to medicine for Choledocholithiasis. General surgery consulted, Dr Jackson, recommends laparoscopic cholecystectomy planning after GI intervention. GI Dr. Gilmore consulted. Patient is s/p ERCP  today, which shows choledocholithiasis, gastritis, with s/p sphincterotomy and stone extraction. Patient underwent Laparoscopic cholecystectomy on ????????. Patient is hemodynamically, with symptom improvement, & tolerating PO diet?????. Patient is being discharged home to follow up with out patient GI doctor.  INCOMPLETE.......INCOMPLTE.........INCOMPLETE  Given patient's improved clinical status and current hemodynamic stability, decision was made to discharge.  Please refer to patient's complete medical chart with documents for a full hospital course, for this is only a brief summary.       Patient is a 68 y/o female with PMH of constipation and spinal stenosis presents with abdominal pain. Upon evaluation in the ED, patient hemodynamically stable, outpatient MRCP records showing 5mm stone in the CBD. Patient admitted to medicine for Choledocholithiasis. General surgery consulted, Dr Jackson, recommends laparoscopic cholecystectomy planning after GI intervention. GI Dr. Gilmore consulted. Patient is s/p ERCP  today, which shows choledocholithiasis, gastritis, with s/p sphincterotomy and stone extraction. Patient was scheduled for Laparoscopic cholecystectomy on 9/21 but cancelled. patient was advised by surgical team to wait to have Lap Colleen done on another day or have it done outpt, but patient insisted to have it done outpatient.  Patient is hemodynamically stable, with symptom improvement. Patient is being discharged home to follow up with Dr. Bey for elective out patient Laparascopic Cholecystectomy.   Danish  #[id number] was used to educate and instruct patient on discharge proceedings & need to follow up with Dr Bey for Lap Colleen. Patient and spouse at bedside verbalized understanding.  Given patient's improved clinical status and current hemodynamic stability, decision was made to discharge.  Please refer to patient's complete medical chart with documents for a full hospital course, for this is only a brief summary.       Patient is a 66 y/o female with PMH of constipation and spinal stenosis presents with abdominal pain. Upon evaluation in the ED, patient hemodynamically stable, outpatient MRCP records showing 5mm stone in the CBD. Patient admitted to medicine for Choledocholithiasis. General surgery consulted, Dr Jackson, recommends laparoscopic cholecystectomy planning after GI intervention. GI Dr. Gilmore consulted. Patient is s/p ERCP, which shows choledocholithiasis, gastritis, with s/p sphincterotomy and stone extraction. Patient was scheduled for Laparoscopic cholecystectomy on 9/21 but cancelled. patient was advised by surgical team to wait to have Lap Colleen done on another day or have it done outpt, but patient insisted to have it done outpatient.  Patient is hemodynamically stable, with symptom improvement. Patient is being discharged home to follow up with Dr. Bey for elective out patient Laparascopic Cholecystectomy.   Swedish  #[id number] was used to educate and instruct patient on discharge proceedings & need to follow up with Dr Bey for Lap Colleen. Patient and spouse at bedside verbalized understanding.  Given patient's improved clinical status and current hemodynamic stability, decision was made to discharge.  Please refer to patient's complete medical chart with documents for a full hospital course, for this is only a brief summary.

## 2022-09-20 NOTE — DISCHARGE NOTE PROVIDER - NSDCMRMEDTOKEN_GEN_ALL_CORE_FT
amphetamine 20 mg oral tablet, disintegratin tab(s) orally once a day (in the morning)  Linzess 72 mcg oral capsule: 1 cap(s) orally once a day  Reglan 5 mg oral tablet: 1 tab(s) orally 2 times a day  Vascepa: Apply topically to affected area , As Needed

## 2022-09-20 NOTE — PROGRESS NOTE ADULT - SUBJECTIVE AND OBJECTIVE BOX
GI Progress Note    Patient is a 67y old  Female who presents with a chief complaint of Choledocholithiasis (20 Sep 2022 12:20)       GI INTERVAL HPI/OVERNIGHT EVENTS: no new complaints    MEDICATIONS  (STANDING):  atorvastatin 40 milliGRAM(s) Oral at bedtime  ciprofloxacin   IVPB 400 milliGRAM(s) IV Intermittent once  heparin   Injectable 5000 Unit(s) SubCutaneous every 8 hours  influenza  Vaccine (HIGH DOSE) 0.7 milliLiter(s) IntraMuscular once  pantoprazole    Tablet 40 milliGRAM(s) Oral two times a day  polyethylene glycol 3350 17 Gram(s) Oral daily  senna 2 Tablet(s) Oral at bedtime    MEDICATIONS  (PRN):  acetaminophen     Tablet .. 650 milliGRAM(s) Oral every 6 hours PRN Mild Pain (1 - 3)  HYDROmorphone  Injectable 0.5 milliGRAM(s) IV Push every 10 minutes PRN Moderate Pain (4 - 6)  ondansetron Injectable 4 milliGRAM(s) IV Push once PRN Nausea and/or Vomiting  traMADol 50 milliGRAM(s) Oral every 4 hours PRN Moderate Pain (4 - 6)      __________________________________________________  REVIEW OF SYSTEMS:            ________________________________________________  PHYSICAL EXAM    Vital Signs Last 24 Hrs  T(C): 36.5 (20 Sep 2022 05:10), Max: 37.1 (19 Sep 2022 13:42)  T(F): 97.7 (20 Sep 2022 05:10), Max: 98.8 (19 Sep 2022 13:42)  HR: 61 (20 Sep 2022 05:10) (53 - 80)  BP: 106/54 (20 Sep 2022 05:10) (106/54 - 134/83)  BP(mean): --  RR: 17 (20 Sep 2022 05:10) (16 - 20)  SpO2: 97% (20 Sep 2022 05:10) (96% - 100%)    Parameters below as of 20 Sep 2022 05:10  Patient On (Oxygen Delivery Method): room air        _________________________________________________  LABS:                        14.3   7.61  )-----------( 151      ( 20 Sep 2022 07:30 )             41.4     09-20    139  |  105  |  18  ----------------------------<  124<H>  4.3   |  28  |  0.95    Ca    9.4      20 Sep 2022 07:30    TPro  7.2  /  Alb  3.3<L>  /  TBili  0.6  /  DBili  0.2  /  AST  32  /  ALT  53  /  AlkPhos  120  09-20        CAPILLARY BLOOD GLUCOSE        COVID-19 PCR: Ja (20 Sep 2022 06:00)  COVID-19 PCR: Ja (16 Sep 2022 08:15)    RADIOLOGY & ADDITIONAL TESTS:                     Advance GI Progress Note    Patient is a 67y old  Female who presents with a chief complaint of Choledocholithiasis (20 Sep 2022 12:20)     GI INTERVAL HPI/OVERNIGHT EVENTS: no new complaints. s/p ERCP 9/19  Pt seen and examined this morning.   Pt awake/alert, sitting up in bed , endorses feeling better, no N/V, no abdominal discomfort.       MEDICATIONS  (STANDING):  atorvastatin 40 milliGRAM(s) Oral at bedtime  ciprofloxacin   IVPB 400 milliGRAM(s) IV Intermittent once  heparin   Injectable 5000 Unit(s) SubCutaneous every 8 hours  influenza  Vaccine (HIGH DOSE) 0.7 milliLiter(s) IntraMuscular once  pantoprazole    Tablet 40 milliGRAM(s) Oral two times a day  polyethylene glycol 3350 17 Gram(s) Oral daily  senna 2 Tablet(s) Oral at bedtime    MEDICATIONS  (PRN):  acetaminophen     Tablet .. 650 milliGRAM(s) Oral every 6 hours PRN Mild Pain (1 - 3)  HYDROmorphone  Injectable 0.5 milliGRAM(s) IV Push every 10 minutes PRN Moderate Pain (4 - 6)  ondansetron Injectable 4 milliGRAM(s) IV Push once PRN Nausea and/or Vomiting  traMADol 50 milliGRAM(s) Oral every 4 hours PRN Moderate Pain (4 - 6)      __________________________________________________  REVIEW OF SYSTEMS:  CONSTITUTIONAL:  No fever, chills, or weakness  HEENT:  No oral discomfort or  throat pain, no odynophagia  SKIN:  No rash or itching.  CARDIOVASCULAR:  No chest  discomfort.   RESPIRATORY: No SOB.  GASTROINTESTINAL:  SEE HPI  GENITOURINARY:  No dysuria, hematuria, urinary frequency  NEUROLOGICAL:  No headache, dizziness,  MUSCULOSKELETAL:  No muscle, or  joint pain   HEMATOLOGIC:  No  bleeding    ________________________________________________  PHYSICAL EXAM    Vital Signs Last 24 Hrs  T(C): 36.5 (20 Sep 2022 05:10), Max: 37.1 (19 Sep 2022 13:42)  T(F): 97.7 (20 Sep 2022 05:10), Max: 98.8 (19 Sep 2022 13:42)  HR: 61 (20 Sep 2022 05:10) (53 - 80)  BP: 106/54 (20 Sep 2022 05:10) (106/54 - 134/83)  BP(mean): --  RR: 17 (20 Sep 2022 05:10) (16 - 20)  SpO2: 97% (20 Sep 2022 05:10) (96% - 100%)    Parameters below as of 20 Sep 2022 05:10  Patient On (Oxygen Delivery Method): room air    Gen: NAD  HEENT: PERRL, anicteric, no oral mucositis  Resp: Clear breath sounds on auscultation. No rales, no wheezing  CVS: S1S2 present, regular  GI: BS(+), Soft, ND, NT  Extremities : BLE No edema or calf tenderness. PPP  Neuro: Awake/alert.  no new neuro deficits  Skin: warm,dry,no rash      _________________________________________________  LABS:                        14.3   7.61  )-----------( 151      ( 20 Sep 2022 07:30 )             41.4     09-20    139  |  105  |  18  ----------------------------<  124<H>  4.3   |  28  |  0.95    Ca    9.4      20 Sep 2022 07:30    TPro  7.2  /  Alb  3.3<L>  /  TBili  0.6  /  DBili  0.2  /  AST  32  /  ALT  53  /  AlkPhos  120  09-20    CAPILLARY BLOOD GLUCOSE    COVID-19 PCR: NotDetec (20 Sep 2022 06:00)  COVID-19 PCR: NotDetec (16 Sep 2022 08:15)    RADIOLOGY & ADDITIONAL TESTS:    < from: ERCP (09.19.22 @ 13:38) >  ERCP Findings:        The duodenoscope was passed into thesecond portion of the duodenum. The major    papilla was identified and appeared unremarkable. The Esophagus was normal. Mild    antral gastritis noted. Shelbi ampullary diverticulum. CBD cannulated with 44    tome. 8 mm stone visualized. Large sphincterotomy performed.Balloon used to    extract stone. No remaining stones seen. Intrahepatics normal. PD not    cannulated. Minor oozing tamponaded with balloon        Impressions:        Choledocholithiasis, gastritis, s/p sphincterotomy and stone extraction.        Plan:        PPI BID for 2 weeks Clears tonight No NSAIDS for 14 day s Needs Lap debbie        Clyde Gross    < end of copied text >

## 2022-09-20 NOTE — DISCHARGE NOTE PROVIDER - ATTENDING DISCHARGE PHYSICAL EXAMINATION:
Patient seen and examined. Communicated with patient via Chilean . Patient understandably upset that cholecystectomy was canceled again. She would like to pursue it as an outpatient.  PHYSICAL EXAM:  GENERAL: NAD, well-developed  HEAD:  Atraumatic, Normocephalic  EYES: Anicteric, conjunctiva and sclera clear  NECK: Supple, No JVD  CHEST/LUNG: Clear to auscultation bilaterally; No wheeze, rhonchi, rales  HEART: Regular rate and rhythm; No murmurs, rubs, or gallops  ABDOMEN: Soft, Nontender, Nondistended; Bowel sounds present  EXTREMITIES:  2+ Peripheral Pulses, No clubbing, cyanosis, or edema  PSYCH: AAOx3, cooperative  NEUROLOGY: non-focal  SKIN: No rashes or lesions, no jaundice

## 2022-09-20 NOTE — PROGRESS NOTE ADULT - NS ATTEND OPT1 GEN_ALL_CORE
I independently performed the documented:
I attest my time as attending is greater than 50% of the total combined time spent on qualifying patient care activities by the PA/NP and attending.
Never smoker

## 2022-09-20 NOTE — DISCHARGE NOTE PROVIDER - PROVIDER TOKENS
FREE:[LAST:[ynes],FIRST:[germania],PHONE:[(   )    -],FAX:[(   )    -],ADDRESS:[[address] [address]  [phone]],FOLLOWUP:[1 week]],PROVIDER:[TOKEN:[[id number]],FOLLOWUP:[1 week],ESTABLISHEDPATIENT:[T]] PROVIDER:[TOKEN:[[id number]],FOLLOWUP:[1 week],ESTABLISHEDPATIENT:[T]],FREE:[LAST:[ynes],FIRST:[germania],PHONE:[(   )    -],FAX:[(   )    -],ADDRESS:[[address] [place] [address]  [phone]],FOLLOWUP:[1 week]],PROVIDER:[TOKEN:[[id number]],FOLLOWUP:[1 week],ESTABLISHEDPATIENT:[T]]

## 2022-09-20 NOTE — PROGRESS NOTE ADULT - NS ATTEND AMEND GEN_ALL_CORE FT
Agree with above, I have seen and examined the patient  ERCP reviewed.  Condition discussed with patient, options risks and benefits explained.  Plan: laparoscopic cholecystectomy, possible open in AM.
Patient seen and examined. Case discussed with ANASTASIA Clay. Communicated with patient via Botswanan  #[id number], Leslie. In brief, this is a 51 yo F with hx of spinal stenosis who presents with epigastric pain and subsequently found to have choledocholithiasis on outpatient MRCP. Patient underwent ERCP yesterday and was found to have choledocholithasis, gastritis and is s/p sphincterotomy with stone extraction. Patient is recommended for laparascopic cholecystectomy. Plan was for OR today, but patient was unable to be scheduled due to lack of available OR time. Spoke with Dr. Bey and patient will be the first case tomorrow.    A/P    #Choledocholithiasis  #Constipation  #HLD    -NPO after MN for lap-debbie tomorrow  -Continue Senna and Miralax for constipation  -Continue Lipitor for HLD    Remaining care as noted above.

## 2022-09-20 NOTE — DISCHARGE NOTE PROVIDER - CARE PROVIDER_API CALL
[facility]  [address] [facility]  [address]  [phone]  Phone: (   )    -  Fax: (   )    -  Follow Up Time: 1 week    Octavio Gilmore)  [facility]  [address] [facility], [address]nd [place] [address]  Phone: [phone]  Fax: [phone]  Established Patient  Follow Up Time: 1 week   Octavio Gilmore)  Medicine  [address] [facility], [address]nd [place] [address]  Phone: [phone]  Fax: [phone]  Established Patient  Follow Up Time: 1 week    [facility]  [address] [place] [address]  [phone]  Phone: (   )    -  Fax: (   )    -  Follow Up Time: 1 week    Orville Bey)  Surgery  [address] [facility], [place] [address]  Phone: [phone]  Fax: [phone]  Established Patient  Follow Up Time: 1 week

## 2022-09-20 NOTE — PROGRESS NOTE ADULT - ASSESSMENT
1. Abdominal pain  2. Cholelithiasis  3. CBD stone  4. Biliary colic  5. S/p ERCP with stone extraction     Suggestions:    1. Surgical follow up  2. Follow up LFT's  3. Diet as tolerated  4. Avoid NSAID  5. Protonix daily  6. DVT prophylaxis

## 2022-09-20 NOTE — PROGRESS NOTE ADULT - PROBLEM SELECTOR PLAN 1
s/p ERCP 9/19 findings Choledocholithiasis, gastritis, s/p sphincterotomy and stone extraction.  Clear liquids. Advance as tolerated.   Continue PPI BID for 2 weeks.   No NSAIDs for 14 days.   Needs lap debbie.  Surgery following.

## 2022-09-21 ENCOUNTER — TRANSCRIPTION ENCOUNTER (OUTPATIENT)
Age: 67
End: 2022-09-21

## 2022-09-21 VITALS
WEIGHT: 149.25 LBS | SYSTOLIC BLOOD PRESSURE: 123 MMHG | OXYGEN SATURATION: 96 % | HEART RATE: 57 BPM | TEMPERATURE: 98 F | RESPIRATION RATE: 17 BRPM | DIASTOLIC BLOOD PRESSURE: 74 MMHG

## 2022-09-21 LAB
ANION GAP SERPL CALC-SCNC: 6 MMOL/L — SIGNIFICANT CHANGE UP (ref 5–17)
APTT BLD: 30.1 SEC — SIGNIFICANT CHANGE UP (ref 27.5–35.5)
BUN SERPL-MCNC: 17 MG/DL — SIGNIFICANT CHANGE UP (ref 7–18)
CALCIUM SERPL-MCNC: 9.5 MG/DL — SIGNIFICANT CHANGE UP (ref 8.4–10.5)
CHLORIDE SERPL-SCNC: 104 MMOL/L — SIGNIFICANT CHANGE UP (ref 96–108)
CO2 SERPL-SCNC: 29 MMOL/L — SIGNIFICANT CHANGE UP (ref 22–31)
CREAT SERPL-MCNC: 0.88 MG/DL — SIGNIFICANT CHANGE UP (ref 0.5–1.3)
EGFR: 72 ML/MIN/1.73M2 — SIGNIFICANT CHANGE UP
GLUCOSE SERPL-MCNC: 105 MG/DL — HIGH (ref 70–99)
HCT VFR BLD CALC: 40.6 % — SIGNIFICANT CHANGE UP (ref 34.5–45)
HGB BLD-MCNC: 13.8 G/DL — SIGNIFICANT CHANGE UP (ref 11.5–15.5)
INR BLD: 1.07 RATIO — SIGNIFICANT CHANGE UP (ref 0.88–1.16)
MCHC RBC-ENTMCNC: 30.5 PG — SIGNIFICANT CHANGE UP (ref 27–34)
MCHC RBC-ENTMCNC: 34 GM/DL — SIGNIFICANT CHANGE UP (ref 32–36)
MCV RBC AUTO: 89.6 FL — SIGNIFICANT CHANGE UP (ref 80–100)
NRBC # BLD: 0 /100 WBCS — SIGNIFICANT CHANGE UP (ref 0–0)
PLATELET # BLD AUTO: 136 K/UL — LOW (ref 150–400)
POTASSIUM SERPL-MCNC: 3.7 MMOL/L — SIGNIFICANT CHANGE UP (ref 3.5–5.3)
POTASSIUM SERPL-SCNC: 3.7 MMOL/L — SIGNIFICANT CHANGE UP (ref 3.5–5.3)
PROTHROM AB SERPL-ACNC: 12.8 SEC — SIGNIFICANT CHANGE UP (ref 10.5–13.4)
RBC # BLD: 4.53 M/UL — SIGNIFICANT CHANGE UP (ref 3.8–5.2)
RBC # FLD: 12.7 % — SIGNIFICANT CHANGE UP (ref 10.3–14.5)
SODIUM SERPL-SCNC: 139 MMOL/L — SIGNIFICANT CHANGE UP (ref 135–145)
WBC # BLD: 5.5 K/UL — SIGNIFICANT CHANGE UP (ref 3.8–10.5)
WBC # FLD AUTO: 5.5 K/UL — SIGNIFICANT CHANGE UP (ref 3.8–10.5)

## 2022-09-21 PROCEDURE — 83605 ASSAY OF LACTIC ACID: CPT

## 2022-09-21 PROCEDURE — 76000 FLUOROSCOPY <1 HR PHYS/QHP: CPT

## 2022-09-21 PROCEDURE — 85730 THROMBOPLASTIN TIME PARTIAL: CPT

## 2022-09-21 PROCEDURE — 36415 COLL VENOUS BLD VENIPUNCTURE: CPT

## 2022-09-21 PROCEDURE — 82247 BILIRUBIN TOTAL: CPT

## 2022-09-21 PROCEDURE — 85025 COMPLETE CBC W/AUTO DIFF WBC: CPT

## 2022-09-21 PROCEDURE — 86850 RBC ANTIBODY SCREEN: CPT

## 2022-09-21 PROCEDURE — 81003 URINALYSIS AUTO W/O SCOPE: CPT

## 2022-09-21 PROCEDURE — 85027 COMPLETE CBC AUTOMATED: CPT

## 2022-09-21 PROCEDURE — 83735 ASSAY OF MAGNESIUM: CPT

## 2022-09-21 PROCEDURE — 82962 GLUCOSE BLOOD TEST: CPT

## 2022-09-21 PROCEDURE — 84484 ASSAY OF TROPONIN QUANT: CPT

## 2022-09-21 PROCEDURE — C1769: CPT

## 2022-09-21 PROCEDURE — 86803 HEPATITIS C AB TEST: CPT

## 2022-09-21 PROCEDURE — 83690 ASSAY OF LIPASE: CPT

## 2022-09-21 PROCEDURE — 99239 HOSP IP/OBS DSCHRG MGMT >30: CPT

## 2022-09-21 PROCEDURE — 93005 ELECTROCARDIOGRAM TRACING: CPT

## 2022-09-21 PROCEDURE — 80053 COMPREHEN METABOLIC PANEL: CPT

## 2022-09-21 PROCEDURE — 87635 SARS-COV-2 COVID-19 AMP PRB: CPT

## 2022-09-21 PROCEDURE — 86901 BLOOD TYPING SEROLOGIC RH(D): CPT

## 2022-09-21 PROCEDURE — 85610 PROTHROMBIN TIME: CPT

## 2022-09-21 PROCEDURE — 86900 BLOOD TYPING SEROLOGIC ABO: CPT

## 2022-09-21 PROCEDURE — 84100 ASSAY OF PHOSPHORUS: CPT

## 2022-09-21 PROCEDURE — C1889: CPT

## 2022-09-21 PROCEDURE — 99285 EMERGENCY DEPT VISIT HI MDM: CPT

## 2022-09-21 PROCEDURE — 80048 BASIC METABOLIC PNL TOTAL CA: CPT

## 2022-09-21 PROCEDURE — 82248 BILIRUBIN DIRECT: CPT

## 2022-09-21 RX ADMIN — CHLORHEXIDINE GLUCONATE 1 APPLICATION(S): 213 SOLUTION TOPICAL at 08:20

## 2022-09-21 RX ADMIN — PANTOPRAZOLE SODIUM 40 MILLIGRAM(S): 20 TABLET, DELAYED RELEASE ORAL at 05:09

## 2022-09-21 NOTE — PROGRESS NOTE ADULT - GIT GEN HX ROS MEA POS PC
nausea/vomiting/abdominal pain

## 2022-09-21 NOTE — PROGRESS NOTE ADULT - REASON FOR ADMISSION
Choledocholithiasis

## 2022-09-21 NOTE — PROGRESS NOTE ADULT - SUBJECTIVE AND OBJECTIVE BOX
[   ] ICU           [   ] CCU              [  X ] Medical Floor  ( patient seen earlier in the morning)      Patient is a 67 year old female with abdominal pain. GI consulted to evaluate.          67 year old female with past medical history significant for constipation, arthritis, and spinal stenosis presents with intermittent epigastric pain, which began last night. Pt reports nausea, chills, NBNB vomiting, and epigastric pain, which prompted her ER visit. Denies fever, diarrhea, hematuria, or dysuria. Pt states that pain radiates to back and bowels, rated as 8/10 in severity. Last bowel movement occurred this morning, denies melena. Reports having 4 similar episodes since 2022. Outpt MRCP showed a 5 mm stone in CBD.      Patient appears comfortable. No new complaints reported, No abdominal pain, N/V, hematemesis, hematochezia, melena, fever, chills, chest pain, SOB, cough or diarrhea reported.      PAIN MANAGEMENT:  Pain Scale:                4 /10  Pain Location:  Epigastric pain      Prior Colonoscopy:  A few years ago      PAST MEDICAL HISTORY  Spinal stenosis  Constipation  Arthritis      PAST SURGICAL HISTORY  Abdominoplasty    section   Varicose vein ligation and stripping   Appendectomy       Allergies    No Known Allergies    Intolerances  None        SOCIAL HISTORY  Advanced Directives:       [  ] Full Code       [  ] DNR  Marital Status:         [  ] M      [  ] S      [  ] D       [  ] W  Children:       [  ] Yes      [  ] No  Occupation:        [  ] Employed       [  ] Unemployed       [  ] Retired  Diet:       [  ] Regular       [  ] PEG feeding          [  ] NG tube feeding  Drug Use:           [  ] Patient denied          [  ] Yes  Alcohol:           [  ] No             [  ] Yes (socially)         [  ] Yes (chronic)  Tobacco:           [  ] Yes           [  ] No      FAMILY HISTORY  [ X ] Heart Disease            [ X ] Diabetes             [ X ] HTN             [  ] Colon Cancer             [  ] Stomach Cancer              [  ] Pancreatic Cancer        VITALS  Vital Signs Last 24 Hrs  T(C): 36.9 (21 Sep 2022 05:28), Max: 37.1 (20 Sep 2022 13:48)  T(F): 98.4 (21 Sep 2022 05:28), Max: 98.8 (20 Sep 2022 13:48)  HR: 57 (21 Sep 2022 05:28) (57 - 58)  BP: 123/74 (21 Sep 2022 05:28) (103/62 - 123/74)   RR: 17 (21 Sep 2022 05:28) (16 - 17)  SpO2: 96% (21 Sep 2022 05:28) (96% - 98%)  Parameters below as of 21 Sep 2022 05:28  Patient On (Oxygen Delivery Method): room air         MEDICATIONS  (STANDING):  atorvastatin 40 milliGRAM(s) Oral at bedtime  chlorhexidine 2% Cloths 1 Application(s) Topical daily  ciprofloxacin   IVPB 400 milliGRAM(s) IV Intermittent once  heparin   Injectable 5000 Unit(s) SubCutaneous every 8 hours  influenza  Vaccine (HIGH DOSE) 0.7 milliLiter(s) IntraMuscular once  pantoprazole    Tablet 40 milliGRAM(s) Oral two times a day  polyethylene glycol 3350 17 Gram(s) Oral daily  senna 2 Tablet(s) Oral at bedtime    MEDICATIONS  (PRN):  acetaminophen     Tablet .. 650 milliGRAM(s) Oral every 6 hours PRN Mild Pain (1 - 3)  HYDROmorphone  Injectable 0.5 milliGRAM(s) IV Push every 10 minutes PRN Moderate Pain (4 - 6)  ondansetron Injectable 4 milliGRAM(s) IV Push once PRN Nausea and/or Vomiting  traMADol 50 milliGRAM(s) Oral every 4 hours PRN Moderate Pain (4 - 6)                            13.8   5.50  )-----------( 136      ( 21 Sep 2022 06:40 )             40.6       09-21    139  |  104  |  17  ----------------------------<  105<H>  3.7   |  29  |  0.88    Ca    9.5      21 Sep 2022 06:40    TPro  7.2  /  Alb  3.3<L>  /  TBili  0.6  /  DBili  0.2  /  AST  32  /  ALT  53  /  AlkPhos  120  09-20      PT/INR - ( 21 Sep 2022 06:40 )   PT: 12.8 sec;   INR: 1.07 ratio         PTT - ( 21 Sep 2022 06:40 )  PTT:30.1 sec

## 2022-09-21 NOTE — PROGRESS NOTE ADULT - NEGATIVE GASTROINTESTINAL SYMPTOMS
no diarrhea/no melena/no hematochezia/no steatorrhea/no jaundice/no hiccoughs

## 2022-09-21 NOTE — PROGRESS NOTE ADULT - NEGATIVE GENERAL GENITOURINARY SYMPTOMS
no hematuria/no renal colic/no flank pain L/no flank pain R/no incontinence/no dysuria

## 2022-09-21 NOTE — PROGRESS NOTE ADULT - PROVIDER SPECIALTY LIST ADULT
Gastroenterology
Internal Medicine
Internal Medicine
Surgery
Gastroenterology
Gastroenterology
Internal Medicine
Surgery
Internal Medicine
Internal Medicine
Gastroenterology

## 2022-09-21 NOTE — PROGRESS NOTE ADULT - NEGATIVE MUSCULOSKELETAL SYMPTOMS
no muscle weakness/no stiffness/no neck pain/no arm pain L/no back pain/no leg pain L/no leg pain R

## 2022-09-21 NOTE — PROGRESS NOTE ADULT - NEGATIVE PSYCHIATRIC SYMPTOMS
no suicidal ideation/no depression/no anxiety/no insomnia/no memory loss/no paranoia/no mood swings/no agitation

## 2022-09-21 NOTE — DISCHARGE NOTE NURSING/CASE MANAGEMENT/SOCIAL WORK - PATIENT PORTAL LINK FT
You can access the FollowMyHealth Patient Portal offered by [facility] by registering at the following website: [email or web address]. By joining [facility]’s FollowMyHealth portal, you will also be able to view your health information using other applications (apps) compatible with our system.

## 2022-09-21 NOTE — DISCHARGE NOTE NURSING/CASE MANAGEMENT/SOCIAL WORK - NSDCPEFALRISK_GEN_ALL_CORE
For information on Fall & Injury Prevention, visit: [email or web address] OR  [email or web address] OR  https://www.cdc.gov/steadi/patient.html

## 2022-09-21 NOTE — PROGRESS NOTE ADULT - NEGATIVE NEUROLOGICAL SYMPTOMS
no syncope/no tremors/no vertigo/no loss of sensation/no headache

## 2022-09-21 NOTE — CHART NOTE - NSCHARTNOTEFT_GEN_A_CORE
Pt seen at bedside. NAD, NPO for surgery today. Notified pt of Dr. Bey's unavailability today, and offered options of elective cholecystectomy if pt tolerates diet, or another surgeon to takeover case. Pt states she does not want to wait any longer because she's been in the hospital for 3 days, and wants surgery to be performed by another surgeon. Explained to patient that time of surgery will be dependent of other surgeon's availability, and that if may not be this morning. Pt expresses verbal understanding. Will reach out to other surgeons for time availability.

## 2022-09-21 NOTE — PROGRESS NOTE ADULT - NEGATIVE GENERAL SYMPTOMS
no fever/no chills/no sweating/no polyphagia/no polyuria/no polydipsia

## 2022-09-21 NOTE — PROGRESS NOTE ADULT - NEGATIVE ENMT SYMPTOMS
no hearing difficulty/no ear pain/no tinnitus/no vertigo/no sinus symptoms/no nasal obstruction/no nose bleeds/no gum bleeding/no dry mouth/no throat pain/no dysphagia

## 2022-09-23 PROBLEM — Z00.00 ENCOUNTER FOR PREVENTIVE HEALTH EXAMINATION: Status: ACTIVE | Noted: 2022-09-23

## 2022-09-26 PROBLEM — M48.00 SPINAL STENOSIS, SITE UNSPECIFIED: Chronic | Status: ACTIVE | Noted: 2022-09-16

## 2022-09-26 PROBLEM — K59.00 CONSTIPATION, UNSPECIFIED: Chronic | Status: ACTIVE | Noted: 2022-09-16

## 2022-09-26 PROBLEM — M19.90 UNSPECIFIED OSTEOARTHRITIS, UNSPECIFIED SITE: Chronic | Status: ACTIVE | Noted: 2022-09-16

## 2022-10-06 ENCOUNTER — APPOINTMENT (OUTPATIENT)
Dept: SURGERY | Facility: CLINIC | Age: 67
End: 2022-10-06

## 2022-10-06 VITALS
WEIGHT: 146 LBS | HEART RATE: 85 BPM | SYSTOLIC BLOOD PRESSURE: 114 MMHG | HEIGHT: 61 IN | TEMPERATURE: 97.1 F | DIASTOLIC BLOOD PRESSURE: 71 MMHG | BODY MASS INDEX: 27.56 KG/M2

## 2022-10-06 DIAGNOSIS — K80.50 CALCULUS OF BILE DUCT W/OUT CHOLANGITIS OR CHOLECYSTITIS W/OUT OBSTRUCTION: ICD-10-CM

## 2022-10-06 PROCEDURE — 99213 OFFICE O/P EST LOW 20 MIN: CPT

## 2022-10-06 NOTE — PHYSICAL EXAM
[Abdominal Masses] : No abdominal masses [Abdomen Tenderness] : ~T ~M No abdominal tenderness [Alert] : alert [Oriented to Place] : oriented to place [Oriented to Time] : oriented to time [Calm] : calm [de-identified] : She  is alert, well-groomed, and in NAD\par   [de-identified] : anicteric.  Nasal mucosa pink, septum midline. Oral mucosa pink.  Tongue midline, Pharynx without exudates.\par   [de-identified] : Neck supple. Trachea midline. Thyroid isthmus barely palpable, lobes not felt.\par

## 2022-10-06 NOTE — HISTORY OF PRESENT ILLNESS
[de-identified] : Patient is a 66 y/o female was admitted to the hospital with abdominal pain. Upon evaluation in the ED, patient  hemodynamically stable, outpatient MRCP records showing 5mm stone in the CBD.  Patient admitted to medicine for Choledocholithiasis.  Patient is s/p ERCP, which showed choledocholithiasis, gastritis, with s/p sphincterotomy and stone extraction.   she is here to discuss and schedule her GB removal.   She reports no nausea or vomiting and no history of jaundice, acholia or choluria.   Appetite is good and weight is stable.

## 2022-10-06 NOTE — PLAN
[FreeTextEntry1] : Ms. JACOBO  was told significance of findings, options, risks and benefits were explained.  Informed consent for laparoscopic/possible open  cholecystectomy  and potential risks, benefits and alternatives (surgical options were discussed including non-surgical options or the option of no surgery) to the planned surgery were discussed in depth.  All surgical options were discussed including non-surgical treatments.  She wishes to proceed with surgery.  We will plan for surgery on at the next available date, pending any required insurance pre-certification or pre-approval. She agrees to obtain any necessary pre-operative evaluations and testing prior to surgery. Patient instructed to maintain a fat-free diet, and to seek immediate medical attention with any acute change or worsening of symptoms, including but not limited to abdominal pain, fever, chills, nausea, vomiting, or yellowing of the skin. \par Patient advised to seek immediate medical attention with any acute change in symptoms or with the development of any new or worsening symptoms.  Patient's questions and concerns addressed to patient's satisfaction, and patient verbalized an understanding of the information discussed.

## 2022-11-01 ENCOUNTER — APPOINTMENT (OUTPATIENT)
Dept: SURGERY | Facility: HOSPITAL | Age: 67
End: 2022-11-01

## 2025-05-22 ENCOUNTER — APPOINTMENT (OUTPATIENT)
Age: 70
End: 2025-05-22
Payer: MEDICARE

## 2025-05-22 VITALS
DIASTOLIC BLOOD PRESSURE: 90 MMHG | WEIGHT: 160.93 LBS | SYSTOLIC BLOOD PRESSURE: 151 MMHG | HEIGHT: 60 IN | BODY MASS INDEX: 31.6 KG/M2 | OXYGEN SATURATION: 96 % | HEART RATE: 79 BPM

## 2025-05-22 VITALS — WEIGHT: 160.94 LBS | HEIGHT: 61 IN | BODY MASS INDEX: 30.39 KG/M2

## 2025-05-22 DIAGNOSIS — M17.11 UNILATERAL PRIMARY OSTEOARTHRITIS, RIGHT KNEE: ICD-10-CM

## 2025-05-22 PROCEDURE — 73564 X-RAY EXAM KNEE 4 OR MORE: CPT | Mod: RT

## 2025-05-22 PROCEDURE — 99203 OFFICE O/P NEW LOW 30 MIN: CPT

## 2025-05-22 RX ORDER — DICLOFENAC SODIUM 75 MG/1
75 TABLET, DELAYED RELEASE ORAL
Qty: 21 | Refills: 2 | Status: ACTIVE | COMMUNITY
Start: 2025-05-22 | End: 1900-01-01

## 2025-07-21 NOTE — ED ADULT NURSE NOTE - EXTENSIONS OF SELF_ADULT
Acelis  / INR 2.7,  therapeutic. (Goal 2.5 ) TTR 82.6 %     Etiology: stable    PLAN: continue the current dose    Recheck INR 2-4 weeks    WARFARIN Plan per protocol: 5 mg every day             
None

## 2025-08-25 ENCOUNTER — APPOINTMENT (OUTPATIENT)
Age: 70
End: 2025-08-25

## (undated) DEVICE — MASK OXYGEN PANORAMIC

## (undated) DEVICE — VENODYNE/SCD SLEEVE CALF MEDIUM

## (undated) DEVICE — SOL INJ NS 0.9% 500ML 1-PORT

## (undated) DEVICE — TUBING CANNULA SALTER LABS NASAL ADULT 7FT

## (undated) DEVICE — BITE BLOCK MOUTHPCW/STRAP